# Patient Record
Sex: MALE | Race: WHITE | Employment: OTHER
[De-identification: names, ages, dates, MRNs, and addresses within clinical notes are randomized per-mention and may not be internally consistent; named-entity substitution may affect disease eponyms.]

---

## 2017-02-06 PROBLEM — E78.00 PRIMARY HYPERCHOLESTEROLEMIA: Status: ACTIVE | Noted: 2017-02-06

## 2017-02-06 PROBLEM — N18.30 CHRONIC KIDNEY DISEASE, STAGE III (MODERATE) (HCC): Status: ACTIVE | Noted: 2017-02-06

## 2017-02-06 PROBLEM — R91.1 NODULE OF LEFT LUNG: Status: ACTIVE | Noted: 2017-02-06

## 2017-06-20 ENCOUNTER — TELEPHONE (OUTPATIENT)
Dept: CASE MANAGEMENT | Age: 67
End: 2017-06-20

## 2017-10-27 PROBLEM — R73.9 HYPERGLYCEMIA: Status: ACTIVE | Noted: 2017-10-27

## 2018-06-05 ENCOUNTER — OFFICE VISIT (OUTPATIENT)
Dept: FAMILY MEDICINE CLINIC | Age: 68
End: 2018-06-05
Payer: MEDICARE

## 2018-06-05 VITALS
HEART RATE: 82 BPM | TEMPERATURE: 98 F | BODY MASS INDEX: 21.7 KG/M2 | DIASTOLIC BLOOD PRESSURE: 62 MMHG | WEIGHT: 155 LBS | HEIGHT: 71 IN | OXYGEN SATURATION: 98 % | RESPIRATION RATE: 16 BRPM | SYSTOLIC BLOOD PRESSURE: 130 MMHG

## 2018-06-05 DIAGNOSIS — N18.30 CHRONIC KIDNEY DISEASE, STAGE III (MODERATE) (HCC): ICD-10-CM

## 2018-06-05 DIAGNOSIS — E78.00 PRIMARY HYPERCHOLESTEROLEMIA: Primary | ICD-10-CM

## 2018-06-05 PROCEDURE — 3017F COLORECTAL CA SCREEN DOC REV: CPT | Performed by: FAMILY MEDICINE

## 2018-06-05 PROCEDURE — G8427 DOCREV CUR MEDS BY ELIG CLIN: HCPCS | Performed by: FAMILY MEDICINE

## 2018-06-05 PROCEDURE — 1123F ACP DISCUSS/DSCN MKR DOCD: CPT | Performed by: FAMILY MEDICINE

## 2018-06-05 PROCEDURE — 1036F TOBACCO NON-USER: CPT | Performed by: FAMILY MEDICINE

## 2018-06-05 PROCEDURE — 4040F PNEUMOC VAC/ADMIN/RCVD: CPT | Performed by: FAMILY MEDICINE

## 2018-06-05 PROCEDURE — G8420 CALC BMI NORM PARAMETERS: HCPCS | Performed by: FAMILY MEDICINE

## 2018-06-05 PROCEDURE — 99213 OFFICE O/P EST LOW 20 MIN: CPT | Performed by: FAMILY MEDICINE

## 2018-06-05 RX ORDER — ROSUVASTATIN CALCIUM 20 MG/1
20 TABLET, COATED ORAL NIGHTLY
Qty: 90 TABLET | Refills: 1 | Status: SHIPPED | OUTPATIENT
Start: 2018-06-05 | End: 2018-09-13 | Stop reason: SDUPTHER

## 2018-09-13 ENCOUNTER — OFFICE VISIT (OUTPATIENT)
Dept: FAMILY MEDICINE CLINIC | Age: 68
End: 2018-09-13
Payer: MEDICARE

## 2018-09-13 VITALS
WEIGHT: 156 LBS | BODY MASS INDEX: 21.84 KG/M2 | DIASTOLIC BLOOD PRESSURE: 60 MMHG | TEMPERATURE: 97 F | RESPIRATION RATE: 16 BRPM | HEIGHT: 71 IN | OXYGEN SATURATION: 98 % | HEART RATE: 70 BPM | SYSTOLIC BLOOD PRESSURE: 128 MMHG

## 2018-09-13 DIAGNOSIS — Z12.5 SCREENING FOR PROSTATE CANCER: ICD-10-CM

## 2018-09-13 DIAGNOSIS — E78.00 PRIMARY HYPERCHOLESTEROLEMIA: Primary | ICD-10-CM

## 2018-09-13 DIAGNOSIS — Z13.31 SCREENING FOR DEPRESSION: ICD-10-CM

## 2018-09-13 DIAGNOSIS — N18.30 CHRONIC KIDNEY DISEASE, STAGE III (MODERATE) (HCC): ICD-10-CM

## 2018-09-13 PROCEDURE — 1101F PT FALLS ASSESS-DOCD LE1/YR: CPT | Performed by: FAMILY MEDICINE

## 2018-09-13 PROCEDURE — G8420 CALC BMI NORM PARAMETERS: HCPCS | Performed by: FAMILY MEDICINE

## 2018-09-13 PROCEDURE — 4040F PNEUMOC VAC/ADMIN/RCVD: CPT | Performed by: FAMILY MEDICINE

## 2018-09-13 PROCEDURE — 99213 OFFICE O/P EST LOW 20 MIN: CPT | Performed by: FAMILY MEDICINE

## 2018-09-13 PROCEDURE — 1036F TOBACCO NON-USER: CPT | Performed by: FAMILY MEDICINE

## 2018-09-13 PROCEDURE — 1123F ACP DISCUSS/DSCN MKR DOCD: CPT | Performed by: FAMILY MEDICINE

## 2018-09-13 PROCEDURE — G8427 DOCREV CUR MEDS BY ELIG CLIN: HCPCS | Performed by: FAMILY MEDICINE

## 2018-09-13 PROCEDURE — G8510 SCR DEP NEG, NO PLAN REQD: HCPCS | Performed by: FAMILY MEDICINE

## 2018-09-13 PROCEDURE — 3017F COLORECTAL CA SCREEN DOC REV: CPT | Performed by: FAMILY MEDICINE

## 2018-09-13 RX ORDER — ROSUVASTATIN CALCIUM 20 MG/1
20 TABLET, COATED ORAL NIGHTLY
Qty: 90 TABLET | Refills: 1 | Status: SHIPPED | OUTPATIENT
Start: 2018-09-13 | End: 2019-03-13

## 2018-09-13 ASSESSMENT — PATIENT HEALTH QUESTIONNAIRE - PHQ9
1. LITTLE INTEREST OR PLEASURE IN DOING THINGS: 0
SUM OF ALL RESPONSES TO PHQ QUESTIONS 1-9: 0
SUM OF ALL RESPONSES TO PHQ9 QUESTIONS 1 & 2: 0
SUM OF ALL RESPONSES TO PHQ QUESTIONS 1-9: 0
2. FEELING DOWN, DEPRESSED OR HOPELESS: 0

## 2018-09-13 NOTE — PROGRESS NOTES
paresthesia, seizures, tremors, or headaches  Hematologic/Lymphatic/Immunologic: no anemia, abnormal bleeding/bruising, fever, chills, night sweats, swollen glands, or unexplained weight loss  Endocrine: no heat or cold intolerance and no polyphagia, polydipsia, or polyuria        OBJECTIVE:     VS:  Wt Readings from Last 3 Encounters:   09/13/18 156 lb (70.8 kg)   06/05/18 155 lb (70.3 kg)   02/01/18 156 lb (70.8 kg)     Temp Readings from Last 3 Encounters:   09/13/18 97 °F (36.1 °C) (Temporal)   06/05/18 98 °F (36.7 °C) (Oral)   02/01/18 97 °F (36.1 °C) (Oral)     BP Readings from Last 3 Encounters:   09/13/18 128/60   06/05/18 130/62   02/01/18 130/78        General appearance: Alert, Awake, Oriented times 3, no distress  Skin: Warm and dry  Head: Normocephalic. No masses, lesions or tenderness noted  Eyes: Conjunctivae appear normal. PERLE  Ears: External ears normal  Nose/Sinuses: Nares normal. Septum midline. Mucosa normal. No drainage  Oropharynx: Oropharynx clear with no exudate seen  Neck: Neck supple. No jugular venous distension, lymphadenopathy or thyromegaly Trachea midline  Lungs: Lungs clear to auscultation bilaterally. No ronchi, crackles or wheezes  Heart: S1 S2  Regular rate and rhythm. No rub, murmur or gallop  Abdomen: Abdomen soft, non-tender. BS normal. No masses, organomegaly  Extremities: Arthritic changes are noted. Movements are limited. Pedal pulses are normal.  Musculoskeletal: Muscular strength appears intact.  No joint effusion, tenderness, swelling or warmth  Neuro:  No focal motor or sensory deficits        ASSESSMENT     Patient Active Problem List    Diagnosis Date Noted    Primary hypercholesterolemia 02/06/2017     Priority: High     Class: Chronic    Chronic kidney disease, stage III (moderate) 02/06/2017     Priority: High     Class: Chronic    Nodule of left lung 02/06/2017     Priority: High     Class: Chronic    Hyperglycemia 10/27/2017    Scapula fracture 10/09/2015

## 2018-12-13 ENCOUNTER — OFFICE VISIT (OUTPATIENT)
Dept: FAMILY MEDICINE CLINIC | Age: 68
End: 2018-12-13
Payer: MEDICARE

## 2018-12-13 VITALS
OXYGEN SATURATION: 97 % | HEIGHT: 71 IN | TEMPERATURE: 97.8 F | RESPIRATION RATE: 19 BRPM | SYSTOLIC BLOOD PRESSURE: 126 MMHG | BODY MASS INDEX: 22.75 KG/M2 | WEIGHT: 162.5 LBS | DIASTOLIC BLOOD PRESSURE: 72 MMHG | HEART RATE: 68 BPM

## 2018-12-13 DIAGNOSIS — E78.00 PRIMARY HYPERCHOLESTEROLEMIA: Primary | ICD-10-CM

## 2018-12-13 DIAGNOSIS — N18.30 CHRONIC KIDNEY DISEASE, STAGE III (MODERATE) (HCC): ICD-10-CM

## 2018-12-13 PROCEDURE — G8420 CALC BMI NORM PARAMETERS: HCPCS | Performed by: FAMILY MEDICINE

## 2018-12-13 PROCEDURE — 1036F TOBACCO NON-USER: CPT | Performed by: FAMILY MEDICINE

## 2018-12-13 PROCEDURE — G8484 FLU IMMUNIZE NO ADMIN: HCPCS | Performed by: FAMILY MEDICINE

## 2018-12-13 PROCEDURE — 1123F ACP DISCUSS/DSCN MKR DOCD: CPT | Performed by: FAMILY MEDICINE

## 2018-12-13 PROCEDURE — 4040F PNEUMOC VAC/ADMIN/RCVD: CPT | Performed by: FAMILY MEDICINE

## 2018-12-13 PROCEDURE — 1101F PT FALLS ASSESS-DOCD LE1/YR: CPT | Performed by: FAMILY MEDICINE

## 2018-12-13 PROCEDURE — 3017F COLORECTAL CA SCREEN DOC REV: CPT | Performed by: FAMILY MEDICINE

## 2018-12-13 PROCEDURE — 99213 OFFICE O/P EST LOW 20 MIN: CPT | Performed by: FAMILY MEDICINE

## 2018-12-13 PROCEDURE — G8427 DOCREV CUR MEDS BY ELIG CLIN: HCPCS | Performed by: FAMILY MEDICINE

## 2018-12-13 NOTE — PROGRESS NOTES
OFFICE PROGRESS NOTE      SUBJECTIVE:        Patient ID:   Nuzhat Juarez is a 76 y.o. male who presents for   Chief Complaint   Patient presents with    3 Month Follow-Up    Hyperlipidemia    Joint Pain     Patient thinks his statin is causing him joint pain so he stopped taking it and does not want to continue.  Health Maintenance     Declines Flu Vaccine & PCV13. HPI:     FEELS GOOD. HAD LOT OF BODY ACHE AND JOINT PAINS FOR THE PAST FEW MONTHS. FELT THAT IT WAS DUE TO CRESTOR AND STOPPED TAKING THE MEDICATION 2 DAYS AGO, FEELS FINE AND HAS NO PROBLEM NOW. WANTS TO TRY WITHOUT MEDICATION AT THIS TIME. WILL TRY MORE REGULAR EXERCISE AND DIET CONTROL. WATCHING DIET GOOD. WALKING FOR EXERCISE. TAKING NO MEDICATIONS REGULARLY NOW. Prior to Admission medications    Medication Sig Start Date End Date Taking?  Authorizing Provider   rosuvastatin (CRESTOR) 20 MG tablet Take 1 tablet by mouth nightly 9/13/18   Allison Hurt MD     Social History     Social History    Marital status:      Spouse name: N/A    Number of children: 2    Years of education: N/A     Social History Main Topics    Smoking status: Never Smoker    Smokeless tobacco: Never Used    Alcohol use No    Drug use: No    Sexual activity: Yes     Partners: Female     Other Topics Concern    None     Social History Narrative    None       I have reviewed Alon's allergies, medications, problem list, medical, social and family history and have updated as needed in the electronic medical record  Review Of Systems:    Skin: no abnormal pigmentation, rash, scaling, itching, masses, hair or nail changes  Eyes: no blurring, diplopia, or eye pain  Ears/Nose/Throat: no hearing loss, tinnitus, vertigo, nosebleed, nasal congestion, rhinorrhea, sore throat  Respiratory: no cough, pleuritic chest pain, dyspnea, or wheezing  Cardiovascular: no chest pain, angina, dyspnea on

## 2018-12-13 NOTE — PATIENT INSTRUCTIONS
LOW SALT  AND LOW FAT DIET. TAKE BABY ASA 1 DAILY. REGULAR WALKING ADVISED. FASTING FOR LAB WORK  PRIOR TO NEXT VISIT. NEXT APPOINTMENT IN 3 MONTHS.

## 2019-03-04 DIAGNOSIS — N18.30 CHRONIC KIDNEY DISEASE, STAGE III (MODERATE) (HCC): Primary | ICD-10-CM

## 2019-03-04 DIAGNOSIS — E78.00 PRIMARY HYPERCHOLESTEROLEMIA: ICD-10-CM

## 2019-03-04 DIAGNOSIS — R73.9 HYPERGLYCEMIA: ICD-10-CM

## 2019-03-05 ENCOUNTER — OFFICE VISIT (OUTPATIENT)
Dept: FAMILY MEDICINE CLINIC | Age: 69
End: 2019-03-05
Payer: MEDICARE

## 2019-03-05 ENCOUNTER — HOSPITAL ENCOUNTER (OUTPATIENT)
Age: 69
Discharge: HOME | End: 2019-03-07
Payer: MEDICARE

## 2019-03-05 VITALS
WEIGHT: 165 LBS | OXYGEN SATURATION: 98 % | DIASTOLIC BLOOD PRESSURE: 70 MMHG | BODY MASS INDEX: 23.1 KG/M2 | HEART RATE: 82 BPM | SYSTOLIC BLOOD PRESSURE: 120 MMHG | TEMPERATURE: 98.7 F | RESPIRATION RATE: 17 BRPM | HEIGHT: 71 IN

## 2019-03-05 DIAGNOSIS — J06.9 VIRAL URI: Primary | ICD-10-CM

## 2019-03-05 DIAGNOSIS — Z12.5 SCREENING FOR PROSTATE CANCER: ICD-10-CM

## 2019-03-05 DIAGNOSIS — E78.00 PRIMARY HYPERCHOLESTEROLEMIA: ICD-10-CM

## 2019-03-05 DIAGNOSIS — R73.9 HYPERGLYCEMIA: ICD-10-CM

## 2019-03-05 DIAGNOSIS — N18.30 CHRONIC KIDNEY DISEASE, STAGE III (MODERATE) (HCC): ICD-10-CM

## 2019-03-05 DIAGNOSIS — Z13.31 DEPRESSION SCREENING: ICD-10-CM

## 2019-03-05 LAB
ALBUMIN SERPL-MCNC: 4.3 G/DL (ref 3.5–5.2)
ALP BLD-CCNC: 88 U/L (ref 40–129)
ALT SERPL-CCNC: 20 U/L (ref 0–40)
ANION GAP SERPL CALCULATED.3IONS-SCNC: 10 MMOL/L (ref 7–16)
AST SERPL-CCNC: 21 U/L (ref 0–39)
BASOPHILS ABSOLUTE: 0.03 E9/L (ref 0–0.2)
BASOPHILS RELATIVE PERCENT: 0.4 % (ref 0–2)
BILIRUB SERPL-MCNC: 0.4 MG/DL (ref 0–1.2)
BUN BLDV-MCNC: 20 MG/DL (ref 8–23)
CALCIUM SERPL-MCNC: 10.2 MG/DL (ref 8.6–10.2)
CHLORIDE BLD-SCNC: 106 MMOL/L (ref 98–107)
CHOLESTEROL, TOTAL: 193 MG/DL (ref 0–199)
CO2: 28 MMOL/L (ref 22–29)
CREAT SERPL-MCNC: 1 MG/DL (ref 0.7–1.2)
EOSINOPHILS ABSOLUTE: 0.19 E9/L (ref 0.05–0.5)
EOSINOPHILS RELATIVE PERCENT: 2.8 % (ref 0–6)
GFR AFRICAN AMERICAN: >60
GFR NON-AFRICAN AMERICAN: >60 ML/MIN/1.73
GLUCOSE BLD-MCNC: 95 MG/DL (ref 74–99)
HCT VFR BLD CALC: 50.8 % (ref 37–54)
HDLC SERPL-MCNC: 35 MG/DL
HEMOGLOBIN: 16.6 G/DL (ref 12.5–16.5)
IMMATURE GRANULOCYTES #: 0.01 E9/L
IMMATURE GRANULOCYTES %: 0.1 % (ref 0–5)
LDL CHOLESTEROL CALCULATED: 136 MG/DL (ref 0–99)
LYMPHOCYTES ABSOLUTE: 0.76 E9/L (ref 1.5–4)
LYMPHOCYTES RELATIVE PERCENT: 11.1 % (ref 20–42)
MCH RBC QN AUTO: 29.9 PG (ref 26–35)
MCHC RBC AUTO-ENTMCNC: 32.7 % (ref 32–34.5)
MCV RBC AUTO: 91.5 FL (ref 80–99.9)
MONOCYTES ABSOLUTE: 0.86 E9/L (ref 0.1–0.95)
MONOCYTES RELATIVE PERCENT: 12.5 % (ref 2–12)
NEUTROPHILS ABSOLUTE: 5.02 E9/L (ref 1.8–7.3)
NEUTROPHILS RELATIVE PERCENT: 73.1 % (ref 43–80)
PDW BLD-RTO: 13.2 FL (ref 11.5–15)
PLATELET # BLD: 188 E9/L (ref 130–450)
PMV BLD AUTO: 11.8 FL (ref 7–12)
POTASSIUM SERPL-SCNC: 4.5 MMOL/L (ref 3.5–5)
PROSTATE SPECIFIC ANTIGEN: 9.5 NG/ML (ref 0–4)
RBC # BLD: 5.55 E12/L (ref 3.8–5.8)
SODIUM BLD-SCNC: 144 MMOL/L (ref 132–146)
TOTAL PROTEIN: 6.7 G/DL (ref 6.4–8.3)
TRIGL SERPL-MCNC: 108 MG/DL (ref 0–149)
VLDLC SERPL CALC-MCNC: 22 MG/DL
WBC # BLD: 6.9 E9/L (ref 4.5–11.5)

## 2019-03-05 PROCEDURE — 1123F ACP DISCUSS/DSCN MKR DOCD: CPT | Performed by: FAMILY MEDICINE

## 2019-03-05 PROCEDURE — G0103 PSA SCREENING: HCPCS

## 2019-03-05 PROCEDURE — G0444 DEPRESSION SCREEN ANNUAL: HCPCS | Performed by: FAMILY MEDICINE

## 2019-03-05 PROCEDURE — G8484 FLU IMMUNIZE NO ADMIN: HCPCS | Performed by: FAMILY MEDICINE

## 2019-03-05 PROCEDURE — G8427 DOCREV CUR MEDS BY ELIG CLIN: HCPCS | Performed by: FAMILY MEDICINE

## 2019-03-05 PROCEDURE — 85025 COMPLETE CBC W/AUTO DIFF WBC: CPT

## 2019-03-05 PROCEDURE — 1036F TOBACCO NON-USER: CPT | Performed by: FAMILY MEDICINE

## 2019-03-05 PROCEDURE — G8420 CALC BMI NORM PARAMETERS: HCPCS | Performed by: FAMILY MEDICINE

## 2019-03-05 PROCEDURE — 99213 OFFICE O/P EST LOW 20 MIN: CPT | Performed by: FAMILY MEDICINE

## 2019-03-05 PROCEDURE — 80061 LIPID PANEL: CPT

## 2019-03-05 PROCEDURE — 80053 COMPREHEN METABOLIC PANEL: CPT

## 2019-03-05 PROCEDURE — 3017F COLORECTAL CA SCREEN DOC REV: CPT | Performed by: FAMILY MEDICINE

## 2019-03-05 PROCEDURE — 4040F PNEUMOC VAC/ADMIN/RCVD: CPT | Performed by: FAMILY MEDICINE

## 2019-03-05 PROCEDURE — 1101F PT FALLS ASSESS-DOCD LE1/YR: CPT | Performed by: FAMILY MEDICINE

## 2019-03-05 ASSESSMENT — PATIENT HEALTH QUESTIONNAIRE - PHQ9
1. LITTLE INTEREST OR PLEASURE IN DOING THINGS: 0
2. FEELING DOWN, DEPRESSED OR HOPELESS: 0
SUM OF ALL RESPONSES TO PHQ QUESTIONS 1-9: 0
SUM OF ALL RESPONSES TO PHQ9 QUESTIONS 1 & 2: 0
SUM OF ALL RESPONSES TO PHQ QUESTIONS 1-9: 0

## 2019-03-13 ENCOUNTER — OFFICE VISIT (OUTPATIENT)
Dept: FAMILY MEDICINE CLINIC | Age: 69
End: 2019-03-13
Payer: MEDICARE

## 2019-03-13 VITALS
WEIGHT: 166.8 LBS | HEART RATE: 65 BPM | TEMPERATURE: 97.8 F | RESPIRATION RATE: 20 BRPM | DIASTOLIC BLOOD PRESSURE: 82 MMHG | OXYGEN SATURATION: 95 % | SYSTOLIC BLOOD PRESSURE: 138 MMHG | HEIGHT: 71 IN | BODY MASS INDEX: 23.35 KG/M2

## 2019-03-13 DIAGNOSIS — J06.9 VIRAL URI: ICD-10-CM

## 2019-03-13 DIAGNOSIS — R97.20 ELEVATED PSA, LESS THAN 10 NG/ML: ICD-10-CM

## 2019-03-13 DIAGNOSIS — E78.00 PRIMARY HYPERCHOLESTEROLEMIA: Primary | ICD-10-CM

## 2019-03-13 PROCEDURE — 99213 OFFICE O/P EST LOW 20 MIN: CPT | Performed by: FAMILY MEDICINE

## 2019-03-13 PROCEDURE — 1036F TOBACCO NON-USER: CPT | Performed by: FAMILY MEDICINE

## 2019-03-13 PROCEDURE — G8484 FLU IMMUNIZE NO ADMIN: HCPCS | Performed by: FAMILY MEDICINE

## 2019-03-13 PROCEDURE — 1101F PT FALLS ASSESS-DOCD LE1/YR: CPT | Performed by: FAMILY MEDICINE

## 2019-03-13 PROCEDURE — G8420 CALC BMI NORM PARAMETERS: HCPCS | Performed by: FAMILY MEDICINE

## 2019-03-13 PROCEDURE — 4040F PNEUMOC VAC/ADMIN/RCVD: CPT | Performed by: FAMILY MEDICINE

## 2019-03-13 PROCEDURE — 1123F ACP DISCUSS/DSCN MKR DOCD: CPT | Performed by: FAMILY MEDICINE

## 2019-03-13 PROCEDURE — G8427 DOCREV CUR MEDS BY ELIG CLIN: HCPCS | Performed by: FAMILY MEDICINE

## 2019-03-13 PROCEDURE — 3017F COLORECTAL CA SCREEN DOC REV: CPT | Performed by: FAMILY MEDICINE

## 2019-03-29 ENCOUNTER — OFFICE VISIT (OUTPATIENT)
Dept: FAMILY MEDICINE CLINIC | Age: 69
End: 2019-03-29
Payer: MEDICARE

## 2019-03-29 ENCOUNTER — HOSPITAL ENCOUNTER (OUTPATIENT)
Age: 69
Discharge: HOME | End: 2019-03-31
Payer: MEDICARE

## 2019-03-29 VITALS
WEIGHT: 166 LBS | OXYGEN SATURATION: 98 % | HEART RATE: 84 BPM | BODY MASS INDEX: 23.24 KG/M2 | HEIGHT: 71 IN | DIASTOLIC BLOOD PRESSURE: 82 MMHG | RESPIRATION RATE: 17 BRPM | SYSTOLIC BLOOD PRESSURE: 132 MMHG | TEMPERATURE: 98.5 F

## 2019-03-29 DIAGNOSIS — R30.0 DYSURIA: ICD-10-CM

## 2019-03-29 DIAGNOSIS — N20.1 LEFT URETERAL CALCULUS: ICD-10-CM

## 2019-03-29 DIAGNOSIS — N18.30 CHRONIC KIDNEY DISEASE, STAGE III (MODERATE) (HCC): Primary | ICD-10-CM

## 2019-03-29 DIAGNOSIS — N18.30 CHRONIC KIDNEY DISEASE, STAGE III (MODERATE) (HCC): ICD-10-CM

## 2019-03-29 LAB
BILIRUBIN, POC: ABNORMAL
BLOOD URINE, POC: ABNORMAL
CLARITY, POC: ABNORMAL
COLOR, POC: ABNORMAL
GLUCOSE URINE, POC: ABNORMAL
KETONES, POC: ABNORMAL
LEUKOCYTE EST, POC: ABNORMAL
NITRITE, POC: ABNORMAL
PH, POC: 5
PROTEIN, POC: ABNORMAL
SPECIFIC GRAVITY, POC: 1.02
UROBILINOGEN, POC: ABNORMAL

## 2019-03-29 PROCEDURE — G8420 CALC BMI NORM PARAMETERS: HCPCS | Performed by: FAMILY MEDICINE

## 2019-03-29 PROCEDURE — 87088 URINE BACTERIA CULTURE: CPT

## 2019-03-29 PROCEDURE — 3017F COLORECTAL CA SCREEN DOC REV: CPT | Performed by: FAMILY MEDICINE

## 2019-03-29 PROCEDURE — 4040F PNEUMOC VAC/ADMIN/RCVD: CPT | Performed by: FAMILY MEDICINE

## 2019-03-29 PROCEDURE — G8427 DOCREV CUR MEDS BY ELIG CLIN: HCPCS | Performed by: FAMILY MEDICINE

## 2019-03-29 PROCEDURE — 1036F TOBACCO NON-USER: CPT | Performed by: FAMILY MEDICINE

## 2019-03-29 PROCEDURE — 1123F ACP DISCUSS/DSCN MKR DOCD: CPT | Performed by: FAMILY MEDICINE

## 2019-03-29 PROCEDURE — 99213 OFFICE O/P EST LOW 20 MIN: CPT | Performed by: FAMILY MEDICINE

## 2019-03-29 PROCEDURE — 81002 URINALYSIS NONAUTO W/O SCOPE: CPT | Performed by: FAMILY MEDICINE

## 2019-03-29 PROCEDURE — G8484 FLU IMMUNIZE NO ADMIN: HCPCS | Performed by: FAMILY MEDICINE

## 2019-03-29 RX ORDER — LEVOFLOXACIN 750 MG/1
750 TABLET ORAL
COMMUNITY
End: 2019-05-29

## 2019-03-31 LAB — URINE CULTURE, ROUTINE: NORMAL

## 2019-05-29 ENCOUNTER — OFFICE VISIT (OUTPATIENT)
Dept: ORTHOPEDIC SURGERY | Age: 69
End: 2019-05-29
Payer: MEDICARE

## 2019-05-29 VITALS — HEIGHT: 71 IN | WEIGHT: 163 LBS | BODY MASS INDEX: 22.82 KG/M2

## 2019-05-29 DIAGNOSIS — M25.511 CHRONIC RIGHT SHOULDER PAIN: Primary | ICD-10-CM

## 2019-05-29 DIAGNOSIS — M19.011 DEGENERATIVE JOINT DISEASE OF RIGHT ACROMIOCLAVICULAR JOINT: ICD-10-CM

## 2019-05-29 DIAGNOSIS — G89.29 CHRONIC RIGHT SHOULDER PAIN: Primary | ICD-10-CM

## 2019-05-29 DIAGNOSIS — M19.011 PRIMARY OSTEOARTHRITIS OF RIGHT SHOULDER: ICD-10-CM

## 2019-05-29 PROCEDURE — 99203 OFFICE O/P NEW LOW 30 MIN: CPT | Performed by: ORTHOPAEDIC SURGERY

## 2019-05-29 NOTE — PROGRESS NOTES
surface. The humeral joint space seems well maintained. ASSESSMENT:  [facility] Diagnoses and all orders for this visit:    Chronic right shoulder pain  -     XR SHOULDER RIGHT (MIN 2 VIEWS); Future    Degenerative joint disease of right acromioclavicular joint    Primary osteoarthritis of right shoulder     Treatment alternatives were reviewed including medical and physical therapies, injections, and surgical options, expected risks benefits and likely outcome of each were discussed in detail, questions asked and answered and understood. PLAN: Open and arthroscopic surgical techniques were discussed with regards to excision of the distal clavicle which I think would address the main problem. The surgery, risks, prognosis, limitations and any rehab issues were discussed in detail with the patient and his wife who was present throughout the visit we'll set him up for outpatient arthroscopic surgery with the possibility of open if needed. Patient Active Problem List   Diagnosis    Primary hypercholesterolemia    Chronic kidney disease, stage III (moderate) (HCC)    Nodule of left lung    Hyperglycemia       Past Medical History:   Diagnosis Date    Prostate disease     Scapula fracture 10/9/2015    Trauma     fell off roof and had broken ribs and scapula; collapsed left lung       Past Surgical History:   Procedure Laterality Date    PROSTATE SURGERY         No current outpatient medications on file. No current facility-administered medications for this visit.         Allergies   Allergen Reactions    Penicillin G Hives    Penicillins Hives     unknown       Social History     Socioeconomic History    Marital status:      Spouse name: None    Number of children: 2    Years of education: None    Highest education level: None   Occupational History    None   Social Needs    Financial resource strain: None    Food insecurity:     Worry: None

## 2019-06-05 ENCOUNTER — OFFICE VISIT (OUTPATIENT)
Dept: FAMILY MEDICINE CLINIC | Age: 69
End: 2019-06-05
Payer: MEDICARE

## 2019-06-05 VITALS
BODY MASS INDEX: 22.57 KG/M2 | OXYGEN SATURATION: 98 % | RESPIRATION RATE: 16 BRPM | TEMPERATURE: 97.8 F | WEIGHT: 161.25 LBS | DIASTOLIC BLOOD PRESSURE: 72 MMHG | HEIGHT: 71 IN | HEART RATE: 66 BPM | SYSTOLIC BLOOD PRESSURE: 130 MMHG

## 2019-06-05 DIAGNOSIS — R97.20 ELEVATED PSA, LESS THAN 10 NG/ML: ICD-10-CM

## 2019-06-05 DIAGNOSIS — Z01.818 PRE-OP EXAMINATION: Primary | ICD-10-CM

## 2019-06-05 DIAGNOSIS — E78.00 PRIMARY HYPERCHOLESTEROLEMIA: ICD-10-CM

## 2019-06-05 DIAGNOSIS — N18.30 CHRONIC KIDNEY DISEASE, STAGE III (MODERATE) (HCC): ICD-10-CM

## 2019-06-05 PROCEDURE — 3017F COLORECTAL CA SCREEN DOC REV: CPT | Performed by: FAMILY MEDICINE

## 2019-06-05 PROCEDURE — 99215 OFFICE O/P EST HI 40 MIN: CPT | Performed by: FAMILY MEDICINE

## 2019-06-05 PROCEDURE — 4040F PNEUMOC VAC/ADMIN/RCVD: CPT | Performed by: FAMILY MEDICINE

## 2019-06-05 PROCEDURE — G8427 DOCREV CUR MEDS BY ELIG CLIN: HCPCS | Performed by: FAMILY MEDICINE

## 2019-06-05 PROCEDURE — G8420 CALC BMI NORM PARAMETERS: HCPCS | Performed by: FAMILY MEDICINE

## 2019-06-05 PROCEDURE — 1123F ACP DISCUSS/DSCN MKR DOCD: CPT | Performed by: FAMILY MEDICINE

## 2019-06-05 PROCEDURE — 93000 ELECTROCARDIOGRAM COMPLETE: CPT | Performed by: FAMILY MEDICINE

## 2019-06-05 PROCEDURE — 1036F TOBACCO NON-USER: CPT | Performed by: FAMILY MEDICINE

## 2019-06-05 NOTE — PROGRESS NOTES
PRE-OP  EVALUATION NOTE. HISTORY OF PRESENT ILLNESS:      The patient is a 71 y.o. male patient [address] who presents with PRE OP EVALUATION. SURGERY PLANNED  ON RIGHT SHOULDER BY DR. Remberto Ventura. NO SYMPTOMS OR PROBLEM AT THIS TIME. Past Medical History:   Diagnosis Date    Prostate disease     Scapula fracture 10/9/2015    Trauma     fell off roof and had broken ribs and scapula; collapsed left lung           Past Surgical History:   Procedure Laterality Date    PROSTATE SURGERY         Medications Prior to Admission:      Current Outpatient Medications:     aspirin 81 MG tablet, Take 81 mg by mouth daily, Disp: , Rfl:     Allergies:    Penicillin g and Penicillins    Social History:    reports that he has never smoked. He has never used smokeless tobacco. He reports that he does not drink alcohol or use drugs. Family History:   family history includes Cancer in his mother. REVIEW OF SYSTEMS:  GENERAL:APPETITE-GOOD  EENT:NO SORE THROAT,EARACHE, HEADACHE OR CONGESTION  GI: BOWEL- NORMAL. NO NAUSEA  OR VOMITING. NO ABDOMINAL PAINS. : NO URINARY SYMPTOMS. MUSCULOSKELETAL: NO PAIN. MOVEMENTS ARE NORMAL. NEUROLOGY:NO DIZZINESS,PAIN OR OTHER SYMPTOMS    PHYSICAL EXAM:    Vitals:  /72   Pulse 66   Temp 97.8 °F (36.6 °C) (Temporal)   Resp 16   Ht 5' 11\" (1.803 m)   Wt 161 lb 4 oz (73.1 kg)   SpO2 98%   BMI 22.49 kg/m²     General:  Awake, alert, oriented X 3. Well developed, well nourished, well groomed. No apparent distress. HEENT:  Normocephalic, atraumatic. Pupils equal, round, reactive to light. No scleral icterus. No conjunctival injection. Normal lips, teeth, and gums. No nasal discharge. Neck:  Supple  Heart:  RRR, no murmurs, gallops, rubs  Lungs:  CTA bilaterally, bilat symmetrical expansion, no wheeze, rales, or rhonchi  Abdomen:   Bowel sounds present, soft, nontender, no masses, no organomegaly, no peritoneal signs  Extremities:  No clubbing,

## 2019-06-05 NOTE — PATIENT INSTRUCTIONS
LOW SALT  AND LOW FAT DIET. CONTINUE CURRENT MEDICATIONS TAKING REGULARLY. REGULAR WALKING ADVISED. OK FOR SURGERY WITH MODERATE RISK FOR GENERAL ANESTHESIA. FASTING FOR LAB WORK ONE MORNING. NEXT APPOINTMENT IN 2 MONTHS.

## 2019-07-09 ENCOUNTER — ANESTHESIA EVENT (OUTPATIENT)
Dept: OPERATING ROOM | Age: 69
End: 2019-07-09
Payer: MEDICARE

## 2019-07-09 NOTE — ANESTHESIA PRE PROCEDURE
Department of Anesthesiology  Preprocedure Note       Name:  Adina Ventura   Age:  71 y.o.  :  1950                                          MRN:  [id number]         Date:  2019      Surgeon: Ericka Fox):  YARITZA Garduno DO    Procedure: ARTHROSCOPIC EXAM AND TREATMENT RIGHT SHOULDER/ EXCISION DISTAL CLAVICLE (Right )    Medications prior to admission:   Prior to Admission medications    Not on File       Current medications:    No current facility-administered medications for this encounter. No current outpatient medications on file. Allergies:     Allergies   Allergen Reactions    Penicillin G Hives    Penicillins Hives     unknown       Problem List:    Patient Active Problem List   Diagnosis Code    Primary hypercholesterolemia E78.00    Chronic kidney disease, stage III (moderate) (HCC) N18.3    Nodule of left lung R91.1    Hyperglycemia R73.9    Elevated PSA, less than 10 ng/ml R97.20       Past Medical History:        Diagnosis Date    Anxiety     Cancer ([address]     skin    Prostate disease     Scapula fracture 10/9/2015    Trauma     fell off roof and had broken ribs and scapula; collapsed left lung       Past Surgical History:        Procedure Laterality Date    COLONOSCOPY      PROSTATE SURGERY      vaporization       Social History:    Social History     Tobacco Use    Smoking status: Never Smoker    Smokeless tobacco: Never Used   Substance Use Topics    Alcohol use: No     Alcohol/week: 0.0 oz                                Counseling given: Not Answered      Vital Signs (Current):   Vitals:    19 1043   Weight: 163 lb (73.9 kg)   Height: 5' 10.5\" (1.791 m)                                              BP Readings from Last 3 Encounters:   19 130/72   19 132/82   19 138/82       NPO Status:                                                                                 BMI:   Wt Readings from Last 3 Encounters:   19 161 lb 4 oz (73.1 kg)

## 2019-07-11 ENCOUNTER — HOSPITAL ENCOUNTER (OUTPATIENT)
Age: 69
Setting detail: OUTPATIENT SURGERY
Discharge: HOME | End: 2019-07-11
Attending: ORTHOPAEDIC SURGERY | Admitting: ORTHOPAEDIC SURGERY
Payer: MEDICARE

## 2019-07-11 ENCOUNTER — ANESTHESIA (OUTPATIENT)
Dept: OPERATING ROOM | Age: 69
End: 2019-07-11
Payer: MEDICARE

## 2019-07-11 VITALS
HEART RATE: 72 BPM | TEMPERATURE: 98.6 F | RESPIRATION RATE: 14 BRPM | DIASTOLIC BLOOD PRESSURE: 83 MMHG | WEIGHT: 159.6 LBS | HEIGHT: 71 IN | BODY MASS INDEX: 22.34 KG/M2 | SYSTOLIC BLOOD PRESSURE: 137 MMHG | OXYGEN SATURATION: 100 %

## 2019-07-11 VITALS
RESPIRATION RATE: 8 BRPM | OXYGEN SATURATION: 96 % | TEMPERATURE: 98.6 F | DIASTOLIC BLOOD PRESSURE: 80 MMHG | SYSTOLIC BLOOD PRESSURE: 110 MMHG

## 2019-07-11 DIAGNOSIS — S43.431A TEAR OF RIGHT GLENOID LABRUM, INITIAL ENCOUNTER: ICD-10-CM

## 2019-07-11 DIAGNOSIS — M19.011 DEGENERATIVE JOINT DISEASE OF RIGHT ACROMIOCLAVICULAR JOINT: Chronic | ICD-10-CM

## 2019-07-11 DIAGNOSIS — M75.51 BURSITIS OF RIGHT SHOULDER: Primary | Chronic | ICD-10-CM

## 2019-07-11 DIAGNOSIS — M75.41 IMPINGEMENT SYNDROME OF RIGHT SHOULDER: ICD-10-CM

## 2019-07-11 DIAGNOSIS — M75.111 NONTRAUMATIC INCOMPLETE TEAR OF RIGHT ROTATOR CUFF: Chronic | ICD-10-CM

## 2019-07-11 PROCEDURE — 3600000004 HC SURGERY LEVEL 4 BASE: Performed by: ORTHOPAEDIC SURGERY

## 2019-07-11 PROCEDURE — 6370000000 HC RX 637 (ALT 250 FOR IP): Performed by: ANESTHESIOLOGY

## 2019-07-11 PROCEDURE — 7100000001 HC PACU RECOVERY - ADDTL 15 MIN: Performed by: ORTHOPAEDIC SURGERY

## 2019-07-11 PROCEDURE — 7100000011 HC PHASE II RECOVERY - ADDTL 15 MIN: Performed by: ORTHOPAEDIC SURGERY

## 2019-07-11 PROCEDURE — 2580000003 HC RX 258: Performed by: ORTHOPAEDIC SURGERY

## 2019-07-11 PROCEDURE — 2500000003 HC RX 250 WO HCPCS: Performed by: ORTHOPAEDIC SURGERY

## 2019-07-11 PROCEDURE — 2580000003 HC RX 258: Performed by: ANESTHESIOLOGY

## 2019-07-11 PROCEDURE — 2720000010 HC SURG SUPPLY STERILE: Performed by: ORTHOPAEDIC SURGERY

## 2019-07-11 PROCEDURE — 6360000002 HC RX W HCPCS: Performed by: NURSE ANESTHETIST, CERTIFIED REGISTERED

## 2019-07-11 PROCEDURE — 2500000003 HC RX 250 WO HCPCS: Performed by: NURSE ANESTHETIST, CERTIFIED REGISTERED

## 2019-07-11 PROCEDURE — 6360000002 HC RX W HCPCS: Performed by: ANESTHESIOLOGY

## 2019-07-11 PROCEDURE — 3700000000 HC ANESTHESIA ATTENDED CARE: Performed by: ORTHOPAEDIC SURGERY

## 2019-07-11 PROCEDURE — 3600000014 HC SURGERY LEVEL 4 ADDTL 15MIN: Performed by: ORTHOPAEDIC SURGERY

## 2019-07-11 PROCEDURE — 7100000000 HC PACU RECOVERY - FIRST 15 MIN: Performed by: ORTHOPAEDIC SURGERY

## 2019-07-11 PROCEDURE — 76942 ECHO GUIDE FOR BIOPSY: CPT | Performed by: ANESTHESIOLOGY

## 2019-07-11 PROCEDURE — 3700000001 HC ADD 15 MINUTES (ANESTHESIA): Performed by: ORTHOPAEDIC SURGERY

## 2019-07-11 PROCEDURE — 7100000010 HC PHASE II RECOVERY - FIRST 15 MIN: Performed by: ORTHOPAEDIC SURGERY

## 2019-07-11 PROCEDURE — 6360000002 HC RX W HCPCS: Performed by: ORTHOPAEDIC SURGERY

## 2019-07-11 PROCEDURE — C1713 ANCHOR/SCREW BN/BN,TIS/BN: HCPCS | Performed by: ORTHOPAEDIC SURGERY

## 2019-07-11 PROCEDURE — 2709999900 HC NON-CHARGEABLE SUPPLY: Performed by: ORTHOPAEDIC SURGERY

## 2019-07-11 DEVICE — ANCHOR SUT W/ ORTHOCORD KNOTLESS FOR ROT CUF REP VERSALOK: Type: IMPLANTABLE DEVICE | Status: FUNCTIONAL

## 2019-07-11 RX ORDER — BUPIVACAINE HYDROCHLORIDE AND EPINEPHRINE 2.5; 5 MG/ML; UG/ML
INJECTION, SOLUTION EPIDURAL; INFILTRATION; INTRACAUDAL; PERINEURAL PRN
Status: DISCONTINUED | OUTPATIENT
Start: 2019-07-11 | End: 2019-07-11 | Stop reason: ALTCHOICE

## 2019-07-11 RX ORDER — LIDOCAINE HYDROCHLORIDE 20 MG/ML
INJECTION, SOLUTION EPIDURAL; INFILTRATION; INTRACAUDAL; PERINEURAL PRN
Status: DISCONTINUED | OUTPATIENT
Start: 2019-07-11 | End: 2019-07-11 | Stop reason: SDUPTHER

## 2019-07-11 RX ORDER — ONDANSETRON 2 MG/ML
INJECTION INTRAMUSCULAR; INTRAVENOUS PRN
Status: DISCONTINUED | OUTPATIENT
Start: 2019-07-11 | End: 2019-07-11 | Stop reason: SDUPTHER

## 2019-07-11 RX ORDER — DEXAMETHASONE SODIUM PHOSPHATE 10 MG/ML
INJECTION, SOLUTION INTRAMUSCULAR; INTRAVENOUS PRN
Status: DISCONTINUED | OUTPATIENT
Start: 2019-07-11 | End: 2019-07-11 | Stop reason: SDUPTHER

## 2019-07-11 RX ORDER — ROPIVACAINE HYDROCHLORIDE 5 MG/ML
INJECTION, SOLUTION EPIDURAL; INFILTRATION; PERINEURAL
Status: DISCONTINUED | OUTPATIENT
Start: 2019-07-11 | End: 2019-07-11 | Stop reason: SDUPTHER

## 2019-07-11 RX ORDER — GLYCOPYRROLATE 1 MG/5 ML
SYRINGE (ML) INTRAVENOUS PRN
Status: DISCONTINUED | OUTPATIENT
Start: 2019-07-11 | End: 2019-07-11 | Stop reason: SDUPTHER

## 2019-07-11 RX ORDER — HYDROCODONE BITARTRATE AND ACETAMINOPHEN 5; 325 MG/1; MG/1
1 TABLET ORAL EVERY 4 HOURS PRN
Qty: 40 TABLET | Refills: 0 | Status: SHIPPED | OUTPATIENT
Start: 2019-07-11 | End: 2019-07-18

## 2019-07-11 RX ORDER — FENTANYL CITRATE 50 UG/ML
INJECTION, SOLUTION INTRAMUSCULAR; INTRAVENOUS PRN
Status: DISCONTINUED | OUTPATIENT
Start: 2019-07-11 | End: 2019-07-11 | Stop reason: SDUPTHER

## 2019-07-11 RX ORDER — NEOSTIGMINE METHYLSULFATE 1 MG/ML
INJECTION, SOLUTION INTRAVENOUS PRN
Status: DISCONTINUED | OUTPATIENT
Start: 2019-07-11 | End: 2019-07-11 | Stop reason: SDUPTHER

## 2019-07-11 RX ORDER — ROCURONIUM BROMIDE 10 MG/ML
INJECTION, SOLUTION INTRAVENOUS PRN
Status: DISCONTINUED | OUTPATIENT
Start: 2019-07-11 | End: 2019-07-11 | Stop reason: SDUPTHER

## 2019-07-11 RX ORDER — PROPOFOL 10 MG/ML
INJECTION, EMULSION INTRAVENOUS PRN
Status: DISCONTINUED | OUTPATIENT
Start: 2019-07-11 | End: 2019-07-11 | Stop reason: SDUPTHER

## 2019-07-11 RX ORDER — HYDROCODONE BITARTRATE AND ACETAMINOPHEN 5; 325 MG/1; MG/1
1 TABLET ORAL
Status: COMPLETED | OUTPATIENT
Start: 2019-07-11 | End: 2019-07-11

## 2019-07-11 RX ORDER — MIDAZOLAM HYDROCHLORIDE 1 MG/ML
INJECTION INTRAMUSCULAR; INTRAVENOUS PRN
Status: DISCONTINUED | OUTPATIENT
Start: 2019-07-11 | End: 2019-07-11 | Stop reason: SDUPTHER

## 2019-07-11 RX ORDER — KETOROLAC TROMETHAMINE 30 MG/ML
INJECTION, SOLUTION INTRAMUSCULAR; INTRAVENOUS PRN
Status: DISCONTINUED | OUTPATIENT
Start: 2019-07-11 | End: 2019-07-11 | Stop reason: SDUPTHER

## 2019-07-11 RX ORDER — SODIUM CHLORIDE, SODIUM LACTATE, POTASSIUM CHLORIDE, CALCIUM CHLORIDE 600; 310; 30; 20 MG/100ML; MG/100ML; MG/100ML; MG/100ML
INJECTION, SOLUTION INTRAVENOUS CONTINUOUS
Status: DISCONTINUED | OUTPATIENT
Start: 2019-07-11 | End: 2019-07-11 | Stop reason: HOSPADM

## 2019-07-11 RX ADMIN — MIDAZOLAM 2 MG: 1 INJECTION INTRAMUSCULAR; INTRAVENOUS at 09:10

## 2019-07-11 RX ADMIN — ROPIVACAINE HYDROCHLORIDE 30 ML: 5 INJECTION, SOLUTION EPIDURAL; INFILTRATION; PERINEURAL at 12:22

## 2019-07-11 RX ADMIN — DEXAMETHASONE SODIUM PHOSPHATE 10 MG: 10 INJECTION, SOLUTION INTRAMUSCULAR; INTRAVENOUS at 09:47

## 2019-07-11 RX ADMIN — VANCOMYCIN HYDROCHLORIDE 1000 MG: 1 INJECTION, POWDER, LYOPHILIZED, FOR SOLUTION INTRAVENOUS at 09:07

## 2019-07-11 RX ADMIN — SODIUM CHLORIDE, POTASSIUM CHLORIDE, SODIUM LACTATE AND CALCIUM CHLORIDE: 600; 310; 30; 20 INJECTION, SOLUTION INTRAVENOUS at 10:00

## 2019-07-11 RX ADMIN — Medication 3 MG: at 11:14

## 2019-07-11 RX ADMIN — PHENYLEPHRINE HYDROCHLORIDE 100 MCG: 10 INJECTION INTRAVENOUS at 10:48

## 2019-07-11 RX ADMIN — Medication 40 MG: at 09:25

## 2019-07-11 RX ADMIN — HYDROCODONE BITARTRATE AND ACETAMINOPHEN 1 TABLET: 5; 325 TABLET ORAL at 12:46

## 2019-07-11 RX ADMIN — PROPOFOL 200 MG: 10 INJECTION, EMULSION INTRAVENOUS at 09:25

## 2019-07-11 RX ADMIN — Medication 0.6 MG: at 11:14

## 2019-07-11 RX ADMIN — LIDOCAINE HYDROCHLORIDE 60 MG: 20 INJECTION, SOLUTION EPIDURAL; INFILTRATION; INTRACAUDAL; PERINEURAL at 09:25

## 2019-07-11 RX ADMIN — FENTANYL CITRATE 50 MCG: 50 INJECTION, SOLUTION INTRAMUSCULAR; INTRAVENOUS at 10:13

## 2019-07-11 RX ADMIN — ONDANSETRON HYDROCHLORIDE 4 MG: 2 INJECTION, SOLUTION INTRAMUSCULAR; INTRAVENOUS at 09:47

## 2019-07-11 RX ADMIN — KETOROLAC TROMETHAMINE 30 MG: 30 INJECTION, SOLUTION INTRAMUSCULAR; INTRAVENOUS at 11:15

## 2019-07-11 RX ADMIN — FENTANYL CITRATE 100 MCG: 50 INJECTION, SOLUTION INTRAMUSCULAR; INTRAVENOUS at 09:10

## 2019-07-11 RX ADMIN — SODIUM CHLORIDE, POTASSIUM CHLORIDE, SODIUM LACTATE AND CALCIUM CHLORIDE: 600; 310; 30; 20 INJECTION, SOLUTION INTRAVENOUS at 07:55

## 2019-07-11 ASSESSMENT — PULMONARY FUNCTION TESTS
PIF_VALUE: 22
PIF_VALUE: 21
PIF_VALUE: 23
PIF_VALUE: 18
PIF_VALUE: 19
PIF_VALUE: 19
PIF_VALUE: 28
PIF_VALUE: 21
PIF_VALUE: 27
PIF_VALUE: 20
PIF_VALUE: 19
PIF_VALUE: 19
PIF_VALUE: 2
PIF_VALUE: 19
PIF_VALUE: 19
PIF_VALUE: 20
PIF_VALUE: 21
PIF_VALUE: 18
PIF_VALUE: 21
PIF_VALUE: 19
PIF_VALUE: 18
PIF_VALUE: 19
PIF_VALUE: 19
PIF_VALUE: 21
PIF_VALUE: 19
PIF_VALUE: 27
PIF_VALUE: 0
PIF_VALUE: 3
PIF_VALUE: 18
PIF_VALUE: 21
PIF_VALUE: 26
PIF_VALUE: 19
PIF_VALUE: 4
PIF_VALUE: 17
PIF_VALUE: 20
PIF_VALUE: 22
PIF_VALUE: 19
PIF_VALUE: 3
PIF_VALUE: 18
PIF_VALUE: 19
PIF_VALUE: 19
PIF_VALUE: 30
PIF_VALUE: 0
PIF_VALUE: 19
PIF_VALUE: 1
PIF_VALUE: 33
PIF_VALUE: 22
PIF_VALUE: 19
PIF_VALUE: 20
PIF_VALUE: 19
PIF_VALUE: 19
PIF_VALUE: 0
PIF_VALUE: 19
PIF_VALUE: 0
PIF_VALUE: 20
PIF_VALUE: 20
PIF_VALUE: 19
PIF_VALUE: 0
PIF_VALUE: 21
PIF_VALUE: 19
PIF_VALUE: 3
PIF_VALUE: 18
PIF_VALUE: 21
PIF_VALUE: 20
PIF_VALUE: 19
PIF_VALUE: 20
PIF_VALUE: 21
PIF_VALUE: 19
PIF_VALUE: 19
PIF_VALUE: 20
PIF_VALUE: 20
PIF_VALUE: 3
PIF_VALUE: 19
PIF_VALUE: 18
PIF_VALUE: 19
PIF_VALUE: 0
PIF_VALUE: 18
PIF_VALUE: 0
PIF_VALUE: 20
PIF_VALUE: 19
PIF_VALUE: 21
PIF_VALUE: 21
PIF_VALUE: 20
PIF_VALUE: 19
PIF_VALUE: 20
PIF_VALUE: 19
PIF_VALUE: 18
PIF_VALUE: 19
PIF_VALUE: 0
PIF_VALUE: 41
PIF_VALUE: 2
PIF_VALUE: 19
PIF_VALUE: 20
PIF_VALUE: 2
PIF_VALUE: 19
PIF_VALUE: 4
PIF_VALUE: 21
PIF_VALUE: 19
PIF_VALUE: 18
PIF_VALUE: 22
PIF_VALUE: 19
PIF_VALUE: 20
PIF_VALUE: 23
PIF_VALUE: 19
PIF_VALUE: 20
PIF_VALUE: 19
PIF_VALUE: 19
PIF_VALUE: 21
PIF_VALUE: 18
PIF_VALUE: 20
PIF_VALUE: 0
PIF_VALUE: 20
PIF_VALUE: 21
PIF_VALUE: 0
PIF_VALUE: 20
PIF_VALUE: 20
PIF_VALUE: 21
PIF_VALUE: 20
PIF_VALUE: 19
PIF_VALUE: 24
PIF_VALUE: 19
PIF_VALUE: 19
PIF_VALUE: 20
PIF_VALUE: 21
PIF_VALUE: 20
PIF_VALUE: 21
PIF_VALUE: 19
PIF_VALUE: 20
PIF_VALUE: 19
PIF_VALUE: 0
PIF_VALUE: 20
PIF_VALUE: 21
PIF_VALUE: 31
PIF_VALUE: 20
PIF_VALUE: 20
PIF_VALUE: 0

## 2019-07-11 ASSESSMENT — PAIN SCALES - GENERAL
PAINLEVEL_OUTOF10: 0
PAINLEVEL_OUTOF10: 3
PAINLEVEL_OUTOF10: 0

## 2019-07-11 ASSESSMENT — PAIN DESCRIPTION - ORIENTATION
ORIENTATION: RIGHT
ORIENTATION: RIGHT

## 2019-07-11 ASSESSMENT — PAIN - FUNCTIONAL ASSESSMENT: PAIN_FUNCTIONAL_ASSESSMENT: 0-10

## 2019-07-11 ASSESSMENT — PAIN DESCRIPTION - PAIN TYPE
TYPE: SURGICAL PAIN
TYPE: SURGICAL PAIN

## 2019-07-11 ASSESSMENT — PAIN DESCRIPTION - LOCATION: LOCATION: SHOULDER

## 2019-07-11 NOTE — ANESTHESIA POSTPROCEDURE EVALUATION
Department of Anesthesiology  Postprocedure Note    Patient: Edi Talley  MRN: [id number]  YOB: 1950  Date of evaluation: 7/11/2019  Time:  12:32 PM     Procedure Summary     Date:  07/11/19 Room / Location:  [address] [place] [address] 01 / [facility]    Anesthesia Start:  0910 Anesthesia Stop:  1147    Procedure:  ARTHROSCOPIC EXAM AND TREATMENT RIGHT SHOULDER/ EXCISION DISTAL CLAVICLE, ROTATOR CUFF REPAIR , SUBACHROMIAL DECOMPRESSION, DISTAL CLAVICLE , BURSECTOMY (Right ) Diagnosis:  (DEGENERATION RIGHT SHOULDER)    Surgeon:  Janey Hutchinson DO Responsible Provider:  Bulmaro Ospina MD    Anesthesia Type:  general ASA Status:  3          Anesthesia Type: general    Diane Phase I: Diane Score: 7    Diane Phase II: Diane Score: 10    Last vitals: Reviewed and per EMR flowsheets.        Anesthesia Post Evaluation    Patient location during evaluation: PACU  Patient participation: complete - patient participated  Level of consciousness: awake  Pain score: 1  Airway patency: patent  Nausea & Vomiting: no nausea  Complications: no  Cardiovascular status: blood pressure returned to baseline  Respiratory status: acceptable  Hydration status: euvolemic

## 2019-07-11 NOTE — H&P
auscultation bilaterally, no crackles or wheezing  Heart:  Normal apical impulse, regular rate and rhythm, normal S1 and S2, no S3 or S4, and no murmur noted  Abdomen:  normal bowel sounds  Skin: warm and dry, no rash or erythema  ENT: tympanic membrane, external ear and ear canal normal bilaterally, oropharynx clear and moist with normal mucous membranes  Musculoskeletal: stiff shoulder    General Labs:  CBC:   Lab Results   Component Value Date    WBC 6.9 03/05/2019    RBC 5.55 03/05/2019    HGB 16.6 03/05/2019    HCT 50.8 03/05/2019    MCV 91.5 03/05/2019    RDW 13.2 03/05/2019     03/05/2019     CMP:    Lab Results   Component Value Date     03/05/2019    K 4.5 03/05/2019     03/05/2019    CO2 28 03/05/2019    BUN 20 03/05/2019    PROT 6.7 03/05/2019     U/A:  No components found for: Carrie Liu, USPGRAV, UP, Barak , UKELIZABETH, UBILI, UBRAYRAY, Jin, [facility] Jay[place], [place], [facility], [place], [place], [place], [facility], [place]    Radiology: AC degen mild [address] degen    ASSESSMENT AND PLAN:    scope      Electronically signed by Christiano Luna DO on 7/11/2019 at 9:15 AM

## 2019-07-11 NOTE — OP NOTE
debrided as well as the labrum. Synovium was debulked within the shoulder joint as well. The scope and shaver then placed subacromial weather was a great deal of thickened hyperemic bursa. This was debrided. The undersurface of the acromion was cleaned up. A taper punch hole was made in the lateral proximal humerus below the greater tuberosity in line with the tear. An Orthocor horizontal mattress suture was placed in the tear and then threaded through the anchor which was then placed in the subacromial space and impacted into the prepared hole. The suture was tensioned and then the anchoring sutures were locked holding the repair in a stable fashion. The subacromial space was cleared out further over to the [facility] joint which was identified and was quite eroded. It was narrowed as well. An incision was made transverse just anterior to the [facility] joint. This was ultimately developed to allow the placement of the shaver down in this area. The tightness the joint made this somewhat difficult initially. The distal end of the clavicle was debrided to make more room and then a larger bur was used to further clean this out and decompress the [facility] joint which seem to be a preop source of his pain. After this was decompressed the instruments were withdrawn. The wound area was aspirated. The shoulder was cleaned up and then Steri-Strips were placed over the wound is closing these nicely. Xeroform gauze and a bulky dressing were applied and the patient was placed in a sling and swath. His anesthetic was reversed he was taken recovery in stable condition. GROSS PATHOLOGY: Labral degeneration and tearing right shoulder. Glenohumeral degeneration right shoulder. Partial-thickness articular side tear supraspinatus right shoulder. Subacromial bursal hypertrophy and hyperemia. Impingement with prominent distal anterior acromion and narrow acromioclavicular joint and thickened distal clavicle.     COMPONENTS USED : Orthocor horizontal mattress suture x1. Versalok anchor x1. All reasonable efforts have been made to minimize the risk of errors that may occur in the use of voice recognition and other electronic means of charting.       Electronically signed by Yoel Churchill DO on 7/11/19 at 12:06 PM

## 2019-07-26 ENCOUNTER — OFFICE VISIT (OUTPATIENT)
Dept: ORTHOPEDIC SURGERY | Age: 69
End: 2019-07-26

## 2019-07-26 VITALS — BODY MASS INDEX: 22.68 KG/M2 | HEIGHT: 71 IN | WEIGHT: 162 LBS

## 2019-07-26 DIAGNOSIS — M19.011 DEGENERATIVE JOINT DISEASE OF RIGHT ACROMIOCLAVICULAR JOINT: Primary | ICD-10-CM

## 2019-07-26 DIAGNOSIS — M19.011 PRIMARY OSTEOARTHRITIS OF RIGHT SHOULDER: ICD-10-CM

## 2019-07-26 DIAGNOSIS — M75.111 INCOMPLETE TEAR OF RIGHT ROTATOR CUFF, UNSPECIFIED WHETHER TRAUMATIC: ICD-10-CM

## 2019-07-26 PROCEDURE — 99024 POSTOP FOLLOW-UP VISIT: CPT | Performed by: ORTHOPAEDIC SURGERY

## 2019-08-19 ENCOUNTER — OFFICE VISIT (OUTPATIENT)
Dept: ORTHOPEDIC SURGERY | Age: 69
End: 2019-08-19

## 2019-08-19 VITALS — HEIGHT: 71 IN | WEIGHT: 162.04 LBS | BODY MASS INDEX: 22.69 KG/M2

## 2019-08-19 DIAGNOSIS — M19.011 DEGENERATIVE JOINT DISEASE OF RIGHT ACROMIOCLAVICULAR JOINT: Primary | ICD-10-CM

## 2019-08-19 DIAGNOSIS — M19.011 PRIMARY OSTEOARTHRITIS OF RIGHT SHOULDER: ICD-10-CM

## 2019-08-19 DIAGNOSIS — M75.121 COMPLETE TEAR OF RIGHT ROTATOR CUFF, UNSPECIFIED WHETHER TRAUMATIC: ICD-10-CM

## 2019-08-19 PROCEDURE — 99024 POSTOP FOLLOW-UP VISIT: CPT | Performed by: NURSE PRACTITIONER

## 2019-10-21 ENCOUNTER — OFFICE VISIT (OUTPATIENT)
Dept: ORTHOPEDIC SURGERY | Age: 69
End: 2019-10-21
Payer: MEDICARE

## 2019-10-21 VITALS — HEIGHT: 71 IN | WEIGHT: 162 LBS | BODY MASS INDEX: 22.68 KG/M2

## 2019-10-21 DIAGNOSIS — M19.011 DEGENERATIVE JOINT DISEASE OF RIGHT ACROMIOCLAVICULAR JOINT: Primary | ICD-10-CM

## 2019-10-21 DIAGNOSIS — M75.111 INCOMPLETE TEAR OF RIGHT ROTATOR CUFF, UNSPECIFIED WHETHER TRAUMATIC: ICD-10-CM

## 2019-10-21 PROCEDURE — G8427 DOCREV CUR MEDS BY ELIG CLIN: HCPCS | Performed by: ORTHOPAEDIC SURGERY

## 2019-10-21 PROCEDURE — 1123F ACP DISCUSS/DSCN MKR DOCD: CPT | Performed by: ORTHOPAEDIC SURGERY

## 2019-10-21 PROCEDURE — G8420 CALC BMI NORM PARAMETERS: HCPCS | Performed by: ORTHOPAEDIC SURGERY

## 2019-10-21 PROCEDURE — 4040F PNEUMOC VAC/ADMIN/RCVD: CPT | Performed by: ORTHOPAEDIC SURGERY

## 2019-10-21 PROCEDURE — 1036F TOBACCO NON-USER: CPT | Performed by: ORTHOPAEDIC SURGERY

## 2019-10-21 PROCEDURE — 3017F COLORECTAL CA SCREEN DOC REV: CPT | Performed by: ORTHOPAEDIC SURGERY

## 2019-10-21 PROCEDURE — G8484 FLU IMMUNIZE NO ADMIN: HCPCS | Performed by: ORTHOPAEDIC SURGERY

## 2019-10-21 PROCEDURE — 99213 OFFICE O/P EST LOW 20 MIN: CPT | Performed by: ORTHOPAEDIC SURGERY

## 2019-12-09 ENCOUNTER — OFFICE VISIT (OUTPATIENT)
Dept: ORTHOPEDIC SURGERY | Age: 69
End: 2019-12-09
Payer: MEDICARE

## 2019-12-09 VITALS — HEIGHT: 71 IN | BODY MASS INDEX: 22.69 KG/M2 | WEIGHT: 162.04 LBS

## 2019-12-09 DIAGNOSIS — M19.011 PRIMARY OSTEOARTHRITIS OF RIGHT SHOULDER: Primary | ICD-10-CM

## 2019-12-09 DIAGNOSIS — M75.121 COMPLETE TEAR OF RIGHT ROTATOR CUFF, UNSPECIFIED WHETHER TRAUMATIC: ICD-10-CM

## 2019-12-09 PROCEDURE — 1123F ACP DISCUSS/DSCN MKR DOCD: CPT | Performed by: ORTHOPAEDIC SURGERY

## 2019-12-09 PROCEDURE — 3017F COLORECTAL CA SCREEN DOC REV: CPT | Performed by: ORTHOPAEDIC SURGERY

## 2019-12-09 PROCEDURE — 99213 OFFICE O/P EST LOW 20 MIN: CPT | Performed by: ORTHOPAEDIC SURGERY

## 2019-12-09 PROCEDURE — G8420 CALC BMI NORM PARAMETERS: HCPCS | Performed by: ORTHOPAEDIC SURGERY

## 2019-12-09 PROCEDURE — G8484 FLU IMMUNIZE NO ADMIN: HCPCS | Performed by: ORTHOPAEDIC SURGERY

## 2019-12-09 PROCEDURE — 4040F PNEUMOC VAC/ADMIN/RCVD: CPT | Performed by: ORTHOPAEDIC SURGERY

## 2019-12-09 PROCEDURE — G8427 DOCREV CUR MEDS BY ELIG CLIN: HCPCS | Performed by: ORTHOPAEDIC SURGERY

## 2019-12-09 PROCEDURE — 1036F TOBACCO NON-USER: CPT | Performed by: ORTHOPAEDIC SURGERY

## 2020-06-24 ENCOUNTER — HOSPITAL ENCOUNTER (EMERGENCY)
Age: 70
Discharge: HOME | End: 2020-06-24
Payer: MEDICARE

## 2020-06-24 VITALS
DIASTOLIC BLOOD PRESSURE: 86 MMHG | WEIGHT: 163 LBS | SYSTOLIC BLOOD PRESSURE: 133 MMHG | RESPIRATION RATE: 20 BRPM | BODY MASS INDEX: 22.74 KG/M2 | HEART RATE: 72 BPM | TEMPERATURE: 97.7 F | OXYGEN SATURATION: 98 %

## 2020-06-24 LAB
BACTERIA: ABNORMAL /HPF
BILIRUBIN URINE: NEGATIVE
BLOOD, URINE: ABNORMAL
CLARITY: ABNORMAL
COLOR: YELLOW
GLUCOSE URINE: NEGATIVE MG/DL
KETONES, URINE: NEGATIVE MG/DL
LEUKOCYTE ESTERASE, URINE: ABNORMAL
NITRITE, URINE: POSITIVE
PH UA: 6 (ref 5–9)
PROTEIN UA: NEGATIVE MG/DL
RBC UA: ABNORMAL /HPF (ref 0–2)
SPECIFIC GRAVITY UA: 1.01 (ref 1–1.03)
UROBILINOGEN, URINE: 0.2 E.U./DL
WBC UA: >20 /HPF (ref 0–5)

## 2020-06-24 PROCEDURE — 87077 CULTURE AEROBIC IDENTIFY: CPT

## 2020-06-24 PROCEDURE — 87186 SC STD MICRODIL/AGAR DIL: CPT

## 2020-06-24 PROCEDURE — 99212 OFFICE O/P EST SF 10 MIN: CPT

## 2020-06-24 PROCEDURE — 81001 URINALYSIS AUTO W/SCOPE: CPT

## 2020-06-24 PROCEDURE — 87088 URINE BACTERIA CULTURE: CPT

## 2020-06-24 RX ORDER — CEPHALEXIN 500 MG/1
500 CAPSULE ORAL 4 TIMES DAILY
Qty: 28 CAPSULE | Refills: 0 | Status: SHIPPED | OUTPATIENT
Start: 2020-06-24 | End: 2020-07-01

## 2020-06-24 NOTE — ED PROVIDER NOTES
Department of Emergency Medicine   ED  Provider Note  Admit Date/RoomTime: 6/24/2020 10:04 AM  ED Room: 04/04  Chief Complaint   Urinary Tract Infection (started  2 weeks ago  with foul smelling   breath   does do  self  cath     has some pressure    did do test strip it was  positive)    History of Present Illness   Source of history provided by:  patient and spouse/SO. History/Exam Limitations: none. Pilar Buenrostro is a 79 y.o. old male who has a past medical history of:   Past Medical History:   Diagnosis Date    Anxiety     Cancer ([facility] [address]     skin    Prostate disease     Scapula fracture 10/9/2015    Trauma     fell off roof and had broken ribs and scapula; collapsed left lung    presents to the emergency department by private vehicle, for possible urinary tract infection. Patient states that 2 weeks ago he began to have foul-smelling urine. His wife states that he self caths himself once a day. He had prostate surgery approximately 1 year ago. No history of prostate cancer. Denies any fevers or chills. He does report some lower bladder pressure. Denies any abdominal pain or new or worsening back or flank pain. No vomiting. Nothing make his symptoms better or worse. ROS    Pertinent positives and negatives are stated within HPI, all other systems reviewed and are negative. Past Surgical History:   Procedure Laterality Date    COLONOSCOPY      PROSTATE SURGERY      vaporization    SHOULDER ARTHROSCOPY Right 7/11/2019    ARTHROSCOPIC EXAM AND TREATMENT RIGHT SHOULDER/ EXCISION DISTAL CLAVICLE, ROTATOR CUFF REPAIR , SUBACHROMIAL DECOMPRESSION, DISTAL CLAVICLE , BURSECTOMY performed by Delvis Brumfield DO at [facility] History:  reports that he has never smoked. He has never used smokeless tobacco. He reports that he does not drink alcohol or use drugs. Family History: family history includes Cancer in his mother.    Allergies: Penicillin g    Physical Exam Microscopic Urinalysis   Result Value Ref Range    WBC, UA >20 (A) 0 - 5 /HPF    RBC, UA 1-3 0 - 2 /HPF    Bacteria, UA MODERATE (A) None Seen /HPF       Imaging: All Radiology results interpreted by Radiologist unless otherwise noted. No orders to display     ED Course / Medical Decision Making   Medications - No data to display     Re-examination:  6/24/20       Time: 1050    Patient updated on results. Of note, patient's penicillin allergy is noted to be hives when he was a small child. He has tolerated several cephalosporins in the past without any difficulty. Consults:   None    Procedures:   none    Medical Decision Making:    Patient is well appearing, non toxic and appropriate for outpatient management. Plan is for symptom management and PCP follow up. No CVA tenderness, no vomiting, no fevers, no abdominal pain, no evidence of pyelonephritis. Will treat with Keflex at this time. Cultures pending. Advised return to the ER or urgent care for any worsening symptoms. Counseling: The emergency provider has spoken with the patient and spouse/SO and discussed todays results, in addition to providing specific details for the plan of care and counseling regarding the diagnosis and prognosis. Questions are answered at this time and they are agreeable with the plan. Assessment      1. Acute cystitis without hematuria      Plan   Disposition: Discharge to home  Patient condition is good    New Medications     New Prescriptions    CEPHALEXIN (KEFLEX) 500 MG CAPSULE    Take 1 capsule by mouth 4 times daily for 7 days     Electronically signed by NAOMI Hernandez   DD: 6/24/20  **This report was transcribed using voice recognition software. Every effort was made to ensure accuracy; however, inadvertent computerized transcription errors may be present.   END OF ED PROVIDER NOTE           NAOMI Hernandez  06/24/20 1053

## 2020-06-26 LAB
ORGANISM: ABNORMAL
URINE CULTURE, ROUTINE: ABNORMAL

## 2021-05-12 ENCOUNTER — HOSPITAL ENCOUNTER (EMERGENCY)
Age: 71
Discharge: HOME | End: 2021-05-12
Payer: MEDICARE

## 2021-05-12 ENCOUNTER — TELEPHONE (OUTPATIENT)
Dept: FAMILY MEDICINE CLINIC | Age: 71
End: 2021-05-12

## 2021-05-12 VITALS
WEIGHT: 160 LBS | BODY MASS INDEX: 22.4 KG/M2 | DIASTOLIC BLOOD PRESSURE: 80 MMHG | OXYGEN SATURATION: 95 % | TEMPERATURE: 98.6 F | HEIGHT: 71 IN | HEART RATE: 76 BPM | SYSTOLIC BLOOD PRESSURE: 115 MMHG | RESPIRATION RATE: 18 BRPM

## 2021-05-12 DIAGNOSIS — S81.811A LACERATION OF RIGHT LOWER EXTREMITY, INITIAL ENCOUNTER: Primary | ICD-10-CM

## 2021-05-12 PROCEDURE — 12001 RPR S/N/AX/GEN/TRNK 2.5CM/<: CPT

## 2021-05-12 PROCEDURE — 90471 IMMUNIZATION ADMIN: CPT | Performed by: NURSE PRACTITIONER

## 2021-05-12 PROCEDURE — 2500000003 HC RX 250 WO HCPCS: Performed by: NURSE PRACTITIONER

## 2021-05-12 PROCEDURE — 96372 THER/PROPH/DIAG INJ SC/IM: CPT

## 2021-05-12 PROCEDURE — 99212 OFFICE O/P EST SF 10 MIN: CPT

## 2021-05-12 PROCEDURE — 6360000002 HC RX W HCPCS: Performed by: NURSE PRACTITIONER

## 2021-05-12 PROCEDURE — 90715 TDAP VACCINE 7 YRS/> IM: CPT | Performed by: NURSE PRACTITIONER

## 2021-05-12 RX ORDER — LIDOCAINE HYDROCHLORIDE 10 MG/ML
5 INJECTION, SOLUTION INFILTRATION; PERINEURAL ONCE
Status: COMPLETED | OUTPATIENT
Start: 2021-05-12 | End: 2021-05-12

## 2021-05-12 RX ORDER — CLINDAMYCIN HYDROCHLORIDE 300 MG/1
300 CAPSULE ORAL 3 TIMES DAILY
Qty: 15 CAPSULE | Refills: 0 | Status: SHIPPED | OUTPATIENT
Start: 2021-05-12 | End: 2021-05-17

## 2021-05-12 RX ADMIN — LIDOCAINE HYDROCHLORIDE 5 ML: 10 INJECTION, SOLUTION INFILTRATION; PERINEURAL at 11:11

## 2021-05-12 RX ADMIN — TETANUS TOXOID, REDUCED DIPHTHERIA TOXOID AND ACELLULAR PERTUSSIS VACCINE, ADSORBED 0.5 ML: 5; 2.5; 8; 8; 2.5 SUSPENSION INTRAMUSCULAR at 11:41

## 2021-05-12 ASSESSMENT — PAIN SCALES - GENERAL: PAINLEVEL_OUTOF10: 0

## 2021-05-12 NOTE — TELEPHONE ENCOUNTER
----- Message from Solomon Toscano MD sent at 5/12/2021 11:05 AM EDT -----  CALL PATIENT FOR APPOINTMENT IN  1 WEEK. NOT SEEN FOR ABOUT 2 YEARS NOW. HAS MULTIPLE MEDICAL PROBLEMS.

## 2021-05-12 NOTE — ED PROVIDER NOTES
EXAM--------------------------------------      Constitutional/General: Alert and oriented x3, well appearing, non toxic in NAD  Head: Normocephalic and atraumatic  Eyes: clear conjunctiva  Mouth: Oropharynx clear, handling secretions, no trismus  Neck: Supple, full ROM,   Pulmonary:. Not in respiratory distress  Cardiovascular:  Regular rate and rhythm,  Abdomen: Soft, non tender, non distended,   Extremities: Moves all extremities x 4. Warm and well perfused  Skin: warm and dry without rash. There is a laceration of the right knee, which measures 5 cm. There is no evidence of foreign body or gross contamination. Neurologic: GCS 15,  Psych: Normal Affect      ------------------------------ ED COURSE/MEDICAL DECISION MAKING----------------------  Medications   Tetanus-Diphth-Acell Pertussis (BOOSTRIX) injection 0.5 mL (has no administration in time range)   lidocaine 1 % injection 5 mL (5 mLs Intradermal Given 5/12/21 1111)             LACERATION REPAIR  PROCEDURE NOTE:  Unless otherwise indicated, this procedure was performed by myself      Laceration #: 1. Location: right knee  Length: 5 cm. The wound area was cleansed with betadine and normal saline. It was irrigated. The wound area was anesthetized with Lidocaine 1% without epinephrine. WOUND COMPLEXITY:    Debridement: None. Undermining: None. Wound Margins Revised: None. Flaps Aligned: yes. The wound was explored with the following results no foreign body or tendon injury seen. The wound was closed with 4-0 Ethilon using interrupted sutures. Dressing:  a sterile dressing was placed. Total number suture: 7       Medical Decision Making:    I wanted to do an xray  Since it is right over the joint, but he did not want an xray-- I did tell him that it could have injured the bone but he did not want an xray. He was given a tetanus shot. He was placed on clindamycin for 5 days.  He was advised that if he develops redness, drainage, or swelling he should have the wound rechecked. He was advised to have the sutures removed in 10 days. Counseling: The emergency provider has spoken with the patient and discussed todays results, in addition to providing specific details for the plan of care and counseling regarding the diagnosis and prognosis. Questions are answered at this time and they are agreeable with the plan.      --------------------------------- IMPRESSION AND DISPOSITION ---------------------------------    IMPRESSION  1.  Laceration of right lower extremity, initial encounter        DISPOSITION  Disposition: Discharge to home  Patient condition is good         MARISSA Bowman - CNP  05/12/21 1143

## 2022-02-19 ENCOUNTER — HOSPITAL ENCOUNTER (EMERGENCY)
Age: 72
Discharge: HOME | End: 2022-02-19
Payer: MEDICARE

## 2022-02-19 VITALS
BODY MASS INDEX: 22.32 KG/M2 | OXYGEN SATURATION: 99 % | RESPIRATION RATE: 16 BRPM | HEART RATE: 84 BPM | DIASTOLIC BLOOD PRESSURE: 84 MMHG | TEMPERATURE: 100 F | SYSTOLIC BLOOD PRESSURE: 132 MMHG | WEIGHT: 160 LBS

## 2022-02-19 DIAGNOSIS — U07.1 COVID-19: Primary | ICD-10-CM

## 2022-02-19 LAB — SARS-COV-2, NAAT: DETECTED

## 2022-02-19 PROCEDURE — 99211 OFF/OP EST MAY X REQ PHY/QHP: CPT

## 2022-02-19 PROCEDURE — 87635 SARS-COV-2 COVID-19 AMP PRB: CPT

## 2022-02-19 NOTE — ED PROVIDER NOTES
[id number] [facility]  Department of Emergency Medicine  UC Encounter Note  22   11:39 AM EST      NAME: Yani Sandoval  :  1950  MRN:  [id number]    Chief Complaint: Fever (chest cold, sinus congestion, cough, for about a week or longer) and Cough      This is a 79-year-old male the presents to urgent care complaining of a weeklong history of fever cough chest cold symptoms sinus pain pressure runny nose nasal congestion. He states he has not been vaccinated for Covid and never had Covid before. He denies abdominal pain nausea vomiting diarrhea or urinary symptoms. On first contact patient he appears to be in no acute distress. Review of Systems  Pertinent positives and negatives are stated within HPI, all other systems reviewed and are negative. Physical Exam  Vitals and nursing note reviewed. Constitutional:       Appearance: He is well-developed. HENT:      Head: Normocephalic and atraumatic. Jaw: There is normal jaw occlusion. No trismus. Right Ear: Hearing, ear canal and external ear normal. Tympanic membrane is bulging. Left Ear: Hearing, ear canal and external ear normal. Tympanic membrane is bulging. Nose: Congestion and rhinorrhea present. Right Sinus: Frontal sinus tenderness present. No maxillary sinus tenderness. Left Sinus: Frontal sinus tenderness present. No maxillary sinus tenderness. Mouth/Throat:      Pharynx: Uvula midline. No uvula swelling. Eyes:      General: Lids are normal.      Conjunctiva/sclera: Conjunctivae normal.      Pupils: Pupils are equal, round, and reactive to light. Cardiovascular:      Rate and Rhythm: Normal rate and regular rhythm. Heart sounds: Normal heart sounds. No murmur heard. Pulmonary:      Effort: Pulmonary effort is normal.      Breath sounds: Normal breath sounds. Abdominal:      General: Bowel sounds are normal.      Palpations: Abdomen is soft.  Abdomen is not rigid. Tenderness: There is no abdominal tenderness. There is no guarding or rebound. Musculoskeletal:      Cervical back: Full passive range of motion without pain, normal range of motion and neck supple. Skin:     General: Skin is warm and dry. Findings: No abrasion or rash. Neurological:      General: No focal deficit present. Mental Status: He is alert and oriented to person, place, and time. GCS: GCS eye subscore is 4. GCS verbal subscore is 5. GCS motor subscore is 6. Cranial Nerves: No cranial nerve deficit. Sensory: No sensory deficit. Coordination: Coordination normal.      Gait: Gait normal.         Procedures    MDM  Number of Diagnoses or Management Options  COVID-19  Diagnosis management comments: Patient is in no acute distress. He is positive for Covid. He does not appear to be septic. I explained to him about Covid and instructions were given. Told to take over-the-counter cough and cold medications take Tylenol take ibuprofen. I did recommend him taking vitamins as well. Instructions given. Told return or go to the ER symptoms worsen. I did tell him to follow-up with his primary care provider. --------------------------------------------- PAST HISTORY ---------------------------------------------  Past Medical History:  has a past medical history of Anxiety, Cancer ([facility] [address] Prostate disease, Scapula fracture, and Trauma. Past Surgical History:  has a past surgical history that includes Prostate surgery; Colonoscopy; and Shoulder arthroscopy (Right, 7/11/2019). Social History:  reports that he has never smoked. He has never used smokeless tobacco. He reports that he does not drink alcohol and does not use drugs. Family History: family history includes Cancer in his mother. The patients home medications have been reviewed.     Allergies: Penicillin g    -------------------------------------------------- RESULTS -------------------------------------------------  Results for orders placed or performed during the hospital encounter of 02/19/22   COVID-19, Rapid    Specimen: Nasopharyngeal Swab   Result Value Ref Range    SARS-CoV-2, NAAT DETECTED (A) Not Detected     No orders to display       ------------------------- NURSING NOTES AND VITALS REVIEWED ---------------------------   The nursing notes within the ED encounter and vital signs as below have been reviewed. /84   Pulse 84   Temp 100 °F (37.8 °C)   Resp 16   Wt 160 lb (72.6 kg)   SpO2 99%   BMI 22.32 kg/m²   Oxygen Saturation Interpretation: Normal      ------------------------------------------ PROGRESS NOTES ------------------------------------------   I have spoken with the patient and discussed todays results, in addition to providing specific details for the plan of care and counseling regarding the diagnosis and prognosis. Their questions are answered at this time and they are agreeable with the plan.      --------------------------------- ADDITIONAL PROVIDER NOTES ---------------------------------     This patient is stable for discharge. I have shared the specific conditions for return, as well as the importance of follow-up. * NOTE: This report was transcribed using voice recognition software.  Every effort was made to ensure accuracy; however, inadvertent computerized transcription errors may be present.    --------------------------------- IMPRESSION AND DISPOSITION ---------------------------------    IMPRESSION  1. COVID-19        DISPOSITION  Disposition: Discharge to home  Patient condition is good       Sherice Oliver PA-C  02/19/22 [id number]

## 2022-04-08 ENCOUNTER — HOSPITAL ENCOUNTER (OUTPATIENT)
Dept: CT IMAGING | Age: 72
Discharge: HOME | End: 2022-04-08
Payer: MEDICARE

## 2022-04-08 DIAGNOSIS — R10.32 ABDOMINAL PAIN, LEFT LOWER QUADRANT: ICD-10-CM

## 2022-04-08 DIAGNOSIS — R31.9 HEMATURIA SYNDROME: ICD-10-CM

## 2022-04-08 PROCEDURE — 74176 CT ABD & PELVIS W/O CONTRAST: CPT

## 2024-11-11 LAB
ALBUMIN SERPL-MCNC: 4.5 G/DL (ref 3.5–5.2)
ALP SERPL-CCNC: 116 U/L (ref 40–129)
ALT SERPL-CCNC: 21 U/L (ref 0–40)
ANION GAP SERPL CALCULATED.3IONS-SCNC: 9 MMOL/L (ref 7–16)
AST SERPL-CCNC: 15 U/L (ref 0–39)
BASOPHILS # BLD: 0.04 K/UL (ref 0–0.2)
BASOPHILS NFR BLD: 1 % (ref 0–2)
BILIRUB SERPL-MCNC: 0.7 MG/DL (ref 0–1.2)
BUN SERPL-MCNC: 23 MG/DL (ref 6–23)
CALCIUM SERPL-MCNC: 10.5 MG/DL (ref 8.6–10.2)
CHLORIDE SERPL-SCNC: 105 MMOL/L (ref 98–107)
CO2 SERPL-SCNC: 26 MMOL/L (ref 22–29)
CREAT SERPL-MCNC: 0.9 MG/DL (ref 0.7–1.2)
CRP SERPL HS-MCNC: <3 MG/L (ref 0–5)
DATE LAST DOSE: NORMAL
EOSINOPHIL # BLD: 0.22 K/UL (ref 0.05–0.5)
EOSINOPHILS RELATIVE PERCENT: 3 % (ref 0–6)
ERYTHROCYTE [DISTWIDTH] IN BLOOD BY AUTOMATED COUNT: 12.8 % (ref 11.5–15)
ERYTHROCYTE [SEDIMENTATION RATE] IN BLOOD BY WESTERGREN METHOD: 2 MM/HR (ref 0–15)
GFR, ESTIMATED: >90 ML/MIN/1.73M2
GLUCOSE SERPL-MCNC: 79 MG/DL (ref 74–99)
HCT VFR BLD AUTO: 42.3 % (ref 37–54)
HGB BLD-MCNC: 14.1 G/DL (ref 12.5–16.5)
IMM GRANULOCYTES # BLD AUTO: 0.03 K/UL (ref 0–0.58)
IMM GRANULOCYTES NFR BLD: 0 % (ref 0–5)
LYMPHOCYTES NFR BLD: 0.74 K/UL (ref 1.5–4)
LYMPHOCYTES RELATIVE PERCENT: 10 % (ref 20–42)
MCH RBC QN AUTO: 30.7 PG (ref 26–35)
MCHC RBC AUTO-ENTMCNC: 33.3 G/DL (ref 32–34.5)
MCV RBC AUTO: 92 FL (ref 80–99.9)
MONOCYTES NFR BLD: 0.82 K/UL (ref 0.1–0.95)
MONOCYTES NFR BLD: 11 % (ref 2–12)
NEUTROPHILS NFR BLD: 75 % (ref 43–80)
NEUTS SEG NFR BLD: 5.47 K/UL (ref 1.8–7.3)
PLATELET # BLD AUTO: 172 K/UL (ref 130–450)
PMV BLD AUTO: 10.8 FL (ref 7–12)
POTASSIUM SERPL-SCNC: 4.6 MMOL/L (ref 3.5–5)
PROT SERPL-MCNC: 6.4 G/DL (ref 6.4–8.3)
RBC # BLD AUTO: 4.6 M/UL (ref 3.8–5.8)
SODIUM SERPL-SCNC: 140 MMOL/L (ref 132–146)
TME LAST DOSE: NORMAL H
VANCOMYCIN DOSE: NORMAL MG
VANCOMYCIN TROUGH SERPL-MCNC: 6.9 UG/ML (ref 5–16)
WBC OTHER # BLD: 7.3 K/UL (ref 4.5–11.5)

## 2024-11-18 LAB
ALBUMIN SERPL-MCNC: 4.3 G/DL (ref 3.5–5.2)
ALP SERPL-CCNC: 113 U/L (ref 40–129)
ALT SERPL-CCNC: 27 U/L (ref 0–40)
ANION GAP SERPL CALCULATED.3IONS-SCNC: 6 MMOL/L (ref 7–16)
AST SERPL-CCNC: 17 U/L (ref 0–39)
BASOPHILS # BLD: 0.04 K/UL (ref 0–0.2)
BASOPHILS NFR BLD: 1 % (ref 0–2)
BILIRUB SERPL-MCNC: 0.6 MG/DL (ref 0–1.2)
BUN SERPL-MCNC: 20 MG/DL (ref 6–23)
CALCIUM SERPL-MCNC: 10.3 MG/DL (ref 8.6–10.2)
CHLORIDE SERPL-SCNC: 107 MMOL/L (ref 98–107)
CO2 SERPL-SCNC: 27 MMOL/L (ref 22–29)
CREAT SERPL-MCNC: 0.8 MG/DL (ref 0.7–1.2)
CRP SERPL HS-MCNC: <3 MG/L (ref 0–5)
EOSINOPHIL # BLD: 0.23 K/UL (ref 0.05–0.5)
EOSINOPHILS RELATIVE PERCENT: 3 % (ref 0–6)
ERYTHROCYTE [DISTWIDTH] IN BLOOD BY AUTOMATED COUNT: 12.8 % (ref 11.5–15)
ERYTHROCYTE [SEDIMENTATION RATE] IN BLOOD BY WESTERGREN METHOD: 4 MM/HR (ref 0–15)
GFR, ESTIMATED: >90 ML/MIN/1.73M2
GLUCOSE SERPL-MCNC: 103 MG/DL (ref 74–99)
HCT VFR BLD AUTO: 41.5 % (ref 37–54)
HGB BLD-MCNC: 13.7 G/DL (ref 12.5–16.5)
IMM GRANULOCYTES # BLD AUTO: <0.03 K/UL (ref 0–0.58)
IMM GRANULOCYTES NFR BLD: 0 % (ref 0–5)
LYMPHOCYTES NFR BLD: 0.74 K/UL (ref 1.5–4)
LYMPHOCYTES RELATIVE PERCENT: 11 % (ref 20–42)
MCH RBC QN AUTO: 30.4 PG (ref 26–35)
MCHC RBC AUTO-ENTMCNC: 33 G/DL (ref 32–34.5)
MCV RBC AUTO: 92.2 FL (ref 80–99.9)
MONOCYTES NFR BLD: 0.77 K/UL (ref 0.1–0.95)
MONOCYTES NFR BLD: 11 % (ref 2–12)
NEUTROPHILS NFR BLD: 74 % (ref 43–80)
NEUTS SEG NFR BLD: 5.15 K/UL (ref 1.8–7.3)
PLATELET # BLD AUTO: 188 K/UL (ref 130–450)
PMV BLD AUTO: 11.4 FL (ref 7–12)
POTASSIUM SERPL-SCNC: 4.4 MMOL/L (ref 3.5–5)
PROT SERPL-MCNC: 6.2 G/DL (ref 6.4–8.3)
RBC # BLD AUTO: 4.5 M/UL (ref 3.8–5.8)
SODIUM SERPL-SCNC: 140 MMOL/L (ref 132–146)
WBC OTHER # BLD: 7 K/UL (ref 4.5–11.5)

## 2024-11-19 LAB
DATE LAST DOSE: NORMAL
TME LAST DOSE: NORMAL H
VANCOMYCIN DOSE: NORMAL MG
VANCOMYCIN TROUGH SERPL-MCNC: 7.6 UG/ML (ref 5–16)

## 2024-11-21 LAB
ALBUMIN SERPL-MCNC: 4.3 G/DL (ref 3.5–5.2)
ALP SERPL-CCNC: 133 U/L (ref 40–129)
ALT SERPL-CCNC: 35 U/L (ref 0–40)
ANION GAP SERPL CALCULATED.3IONS-SCNC: 9 MMOL/L (ref 7–16)
AST SERPL-CCNC: 21 U/L (ref 0–39)
BASOPHILS # BLD: 0.06 K/UL (ref 0–0.2)
BASOPHILS NFR BLD: 1 % (ref 0–2)
BILIRUB SERPL-MCNC: 0.4 MG/DL (ref 0–1.2)
BUN SERPL-MCNC: 21 MG/DL (ref 6–23)
CALCIUM SERPL-MCNC: 10 MG/DL (ref 8.6–10.2)
CHLORIDE SERPL-SCNC: 107 MMOL/L (ref 98–107)
CO2 SERPL-SCNC: 26 MMOL/L (ref 22–29)
CREAT SERPL-MCNC: 0.9 MG/DL (ref 0.7–1.2)
EOSINOPHIL # BLD: 0.21 K/UL (ref 0.05–0.5)
EOSINOPHILS RELATIVE PERCENT: 3 % (ref 0–6)
ERYTHROCYTE [DISTWIDTH] IN BLOOD BY AUTOMATED COUNT: 12.8 % (ref 11.5–15)
GFR, ESTIMATED: 90 ML/MIN/1.73M2
GLUCOSE SERPL-MCNC: 99 MG/DL (ref 74–99)
HCT VFR BLD AUTO: 41.3 % (ref 37–54)
HGB BLD-MCNC: 13.4 G/DL (ref 12.5–16.5)
IMM GRANULOCYTES # BLD AUTO: 0.03 K/UL (ref 0–0.58)
IMM GRANULOCYTES NFR BLD: 0 % (ref 0–5)
LYMPHOCYTES NFR BLD: 0.7 K/UL (ref 1.5–4)
LYMPHOCYTES RELATIVE PERCENT: 10 % (ref 20–42)
MCH RBC QN AUTO: 30.2 PG (ref 26–35)
MCHC RBC AUTO-ENTMCNC: 32.4 G/DL (ref 32–34.5)
MCV RBC AUTO: 93 FL (ref 80–99.9)
MONOCYTES NFR BLD: 0.7 K/UL (ref 0.1–0.95)
MONOCYTES NFR BLD: 10 % (ref 2–12)
NEUTROPHILS NFR BLD: 77 % (ref 43–80)
NEUTS SEG NFR BLD: 5.59 K/UL (ref 1.8–7.3)
PLATELET # BLD AUTO: 178 K/UL (ref 130–450)
PMV BLD AUTO: 11.6 FL (ref 7–12)
POTASSIUM SERPL-SCNC: 5 MMOL/L (ref 3.5–5)
PROT SERPL-MCNC: 6.1 G/DL (ref 6.4–8.3)
RBC # BLD AUTO: 4.44 M/UL (ref 3.8–5.8)
SODIUM SERPL-SCNC: 142 MMOL/L (ref 132–146)
WBC OTHER # BLD: 7.3 K/UL (ref 4.5–11.5)

## 2024-12-05 LAB
ALBUMIN SERPL-MCNC: 4.5 G/DL (ref 3.5–5.2)
ALP SERPL-CCNC: 127 U/L (ref 40–129)
ALT SERPL-CCNC: 38 U/L (ref 0–40)
ANION GAP SERPL CALCULATED.3IONS-SCNC: 10 MMOL/L (ref 7–16)
AST SERPL-CCNC: 18 U/L (ref 0–39)
BASOPHILS # BLD: 0.05 K/UL (ref 0–0.2)
BASOPHILS NFR BLD: 1 % (ref 0–2)
BILIRUB SERPL-MCNC: 0.6 MG/DL (ref 0–1.2)
BUN SERPL-MCNC: 18 MG/DL (ref 6–23)
CALCIUM SERPL-MCNC: 10.8 MG/DL (ref 8.6–10.2)
CHLORIDE SERPL-SCNC: 106 MMOL/L (ref 98–107)
CO2 SERPL-SCNC: 26 MMOL/L (ref 22–29)
CREAT SERPL-MCNC: 0.8 MG/DL (ref 0.7–1.2)
EOSINOPHIL # BLD: 0.46 K/UL (ref 0.05–0.5)
EOSINOPHILS RELATIVE PERCENT: 5 % (ref 0–6)
ERYTHROCYTE [DISTWIDTH] IN BLOOD BY AUTOMATED COUNT: 13.2 % (ref 11.5–15)
GFR, ESTIMATED: >90 ML/MIN/1.73M2
GLUCOSE SERPL-MCNC: 82 MG/DL (ref 74–99)
HCT VFR BLD AUTO: 41 % (ref 37–54)
HGB BLD-MCNC: 13.4 G/DL (ref 12.5–16.5)
IMM GRANULOCYTES # BLD AUTO: <0.03 K/UL (ref 0–0.58)
IMM GRANULOCYTES NFR BLD: 0 % (ref 0–5)
LYMPHOCYTES NFR BLD: 0.71 K/UL (ref 1.5–4)
LYMPHOCYTES RELATIVE PERCENT: 8 % (ref 20–42)
MCH RBC QN AUTO: 30.6 PG (ref 26–35)
MCHC RBC AUTO-ENTMCNC: 32.7 G/DL (ref 32–34.5)
MCV RBC AUTO: 93.6 FL (ref 80–99.9)
MONOCYTES NFR BLD: 0.92 K/UL (ref 0.1–0.95)
MONOCYTES NFR BLD: 11 % (ref 2–12)
NEUTROPHILS NFR BLD: 75 % (ref 43–80)
NEUTS SEG NFR BLD: 6.36 K/UL (ref 1.8–7.3)
PLATELET # BLD AUTO: 166 K/UL (ref 130–450)
PMV BLD AUTO: 11.9 FL (ref 7–12)
POTASSIUM SERPL-SCNC: 4.8 MMOL/L (ref 3.5–5)
PROT SERPL-MCNC: 6.4 G/DL (ref 6.4–8.3)
RBC # BLD AUTO: 4.38 M/UL (ref 3.8–5.8)
SODIUM SERPL-SCNC: 142 MMOL/L (ref 132–146)
WBC OTHER # BLD: 8.5 K/UL (ref 4.5–11.5)

## (undated) DEVICE — GAUZE,SPONGE,4"X4",16PLY,XRAY,STRL,LF: Brand: MEDLINE

## (undated) DEVICE — 3M™ MEDIPORE™ SOFT CLOTH TAPE, 4 INCH X 10 YARDS, 12 ROLLS/CASE, 2964: Brand: 3M™ MEDIPORE™

## (undated) DEVICE — SOLUTION SURG PREP ANTIMICROBIAL 4 OZ SKIN WND EXIDINE

## (undated) DEVICE — STANDARD HYPODERMIC NEEDLE,POLYPROPYLENE HUB: Brand: MONOJECT

## (undated) DEVICE — PAD ABD CURITY TENDERSORB 5X9IN

## (undated) DEVICE — PEN: MARKING STD 100/CS: Brand: MEDICAL ACTION INDUSTRIES

## (undated) DEVICE — SYRINGE MED 10ML LUERLOCK TIP W/O SFTY DISP

## (undated) DEVICE — HOOK LOCK LATEX FREE ELASTIC BANDAGE 3INX5YD

## (undated) DEVICE — GLOVE SURG 7 LTX STRL TRIFLEX LNG FINGER

## (undated) DEVICE — SLING SHLDR STD UNIV FOAM BILAT ADJ UNISX

## (undated) DEVICE — NEEDLE HYPO 18GA L1.5IN PNK POLYPR HUB S STL THN WALL FILL

## (undated) DEVICE — 3M™ STERI-DRAPE™ U-DRAPE, LONG 1019: Brand: STERI-DRAPE™

## (undated) DEVICE — MEDI-VAC NON-CONDUCTIVE SUCTION TUBING: Brand: CARDINAL HEALTH

## (undated) DEVICE — SOLUTION IV IRRIG POUR BRL 0.9% SODIUM CHL 2F7124

## (undated) DEVICE — 5-IN-1 BARBED CONNECTOR POLYPROPYLENE 3/16 - 9/16 IN. (5 - 14.3 MM): Brand: ARGYLE

## (undated) DEVICE — SUPER TURBOVAC 90 INTEGRATED CABLE WAND ICW: Brand: COBLATION

## (undated) DEVICE — SOLUTION IRRIG 1000ML 09% SOD CHL USP PIC PLAS CONTAINER

## (undated) DEVICE — STRIP TRAC W3XL40IN SELF ADH SKIN TRAC

## (undated) DEVICE — 1010 S-DRAPE TOWEL DRAPE 10/BX: Brand: STERI-DRAPE™

## (undated) DEVICE — SUTURE PROL SZ 2-0 L30IN NONABSORBABLE BLU L26MM SH 1/2 CIR 8833H

## (undated) DEVICE — BUR SHAVER 5 MMX13 CM 8 FLUT OVL FOR AGGRESSIVE BNE COOLCUT

## (undated) DEVICE — GOWN SURG XL LNG LEN SPUNBOND REINF VELC TIE LEV 4 IMPERV

## (undated) DEVICE — PADDING CAST 4 YDX3 IN COTTON NS WBRL

## (undated) DEVICE — MASTISOL ADHESIVE LIQ 2/3ML

## (undated) DEVICE — NEEDLE SUT PASS FOR ROT CUF LABRAL REP MULTFI SCORPION

## (undated) DEVICE — DRAPE SURG W88XL116IN SMS BODY SPL ORTH N FEN REINF FLD PCH

## (undated) DEVICE — BASIC PACK: Brand: CONVERTORS

## (undated) DEVICE — 3M™ IOBAN™ 2 ANTIMICROBIAL INCISE DRAPE 6640EZ: Brand: IOBAN™ 2

## (undated) DEVICE — GAUZE,SPONGE,4"X4",16PLY,STRL,LF,10/TRAY: Brand: MEDLINE

## (undated) DEVICE — DRESSING GZ XRFRM 4X4(25/BX 6BX/CS)

## (undated) DEVICE — INTENDED FOR TISSUE SEPARATION, AND OTHER PROCEDURES THAT REQUIRE A SHARP SURGICAL BLADE TO PUNCTURE OR CUT.: Brand: BARD-PARKER ® STAINLESS STEEL BLADES

## (undated) DEVICE — BLADE SHV L13CM DIA4MM DBL CUT COOLCUT

## (undated) DEVICE — 3M™ STERI-STRIP™ REINFORCED ADHESIVE SKIN CLOSURES, R1547, 1/2 IN X 4 IN (12 MM X 100 MM), 6 STRIPS/ENVELOPE: Brand: 3M™ STERI-STRIP™

## (undated) DEVICE — TOWEL OR BLUEE 16X26IN ST 8 PACK ORB08 16X26ORTWL

## (undated) DEVICE — 1810 FOAM BLOCK NEEDLE COUNTER: Brand: DEVON

## (undated) DEVICE — DRAPE 100X60IN SHOULDER ARTHROSCOPY

## (undated) DEVICE — NEEDLE SPNL L3.5IN PNK HUB S STL REG WALL FIT STYL W/ QNCKE

## (undated) DEVICE — CHLORAPREP 26ML ORANGE

## (undated) DEVICE — PROBE ABLAT 90DEG ASPIR MULTIPORT BPLR RF 1 PC ELECTRD ERGO

## (undated) DEVICE — GLOVE ORANGE PI 8   MSG9080

## (undated) DEVICE — TUBING PMP L16FT MAIN DISP FOR AR-6400 AR-6475

## (undated) DEVICE — CART DISP LID TRANSPOSAL